# Patient Record
Sex: FEMALE | Race: WHITE | NOT HISPANIC OR LATINO | Employment: PART TIME | URBAN - METROPOLITAN AREA
[De-identification: names, ages, dates, MRNs, and addresses within clinical notes are randomized per-mention and may not be internally consistent; named-entity substitution may affect disease eponyms.]

---

## 2018-09-01 ENCOUNTER — AMBULATORY - HEALTHEAST (OUTPATIENT)
Dept: MULTI SPECIALTY CLINIC | Facility: CLINIC | Age: 38
End: 2018-09-01

## 2018-09-01 LAB — PAP SMEAR - HIM PATIENT REPORTED: NORMAL

## 2019-09-23 ENCOUNTER — TRANSFERRED RECORDS (OUTPATIENT)
Dept: HEALTH INFORMATION MANAGEMENT | Facility: CLINIC | Age: 39
End: 2019-09-23

## 2019-09-23 LAB
ALT SERPL-CCNC: 12 U/L (ref 6–29)
AST SERPL-CCNC: 16 U/L (ref 10–30)
CHOLESTEROL (EXTERNAL): 181 MG/DL
CREATININE (EXTERNAL): 0.87 MG/DL (ref 0.5–1.1)
GFR ESTIMATED (EXTERNAL): 83 ML/MIN/1.73M2
GLUCOSE (EXTERNAL): 84 MG/DL (ref 65–99)
HDLC SERPL-MCNC: 60 MG/DL
LDL CHOLESTEROL (EXTERNAL): 101 MG/DL
NON HDL CHOLESTEROL (EXTERNAL): 121 MG/DL
POTASSIUM (EXTERNAL): 4.4 MMOL/L (ref 3.5–5.3)
TRIGLYCERIDES (EXTERNAL): 100 MG/DL
TSH SERPL-ACNC: 1.61 MIU/L (ref 0.4–4.5)

## 2019-09-25 ENCOUNTER — OFFICE VISIT - HEALTHEAST (OUTPATIENT)
Dept: MIDWIFE SERVICES | Facility: CLINIC | Age: 39
End: 2019-09-25

## 2019-09-25 DIAGNOSIS — Z00.00 NORMAL BREAST EXAM: ICD-10-CM

## 2019-09-25 DIAGNOSIS — Z12.39 BREAST CANCER SCREENING: ICD-10-CM

## 2019-09-25 RX ORDER — BIOTIN 10 MG
TABLET ORAL
Status: SHIPPED | COMMUNITY
Start: 2019-09-25 | End: 2022-04-20

## 2019-09-25 ASSESSMENT — MIFFLIN-ST. JEOR: SCORE: 1392.3

## 2020-09-28 ENCOUNTER — COMMUNICATION - HEALTHEAST (OUTPATIENT)
Dept: MIDWIFE SERVICES | Facility: CLINIC | Age: 40
End: 2020-09-28

## 2021-06-01 NOTE — PROGRESS NOTES
Assessment:      Healthy female exam.      Plan:      Reviewed guidelines for pap screening-- no need for pap smear today.  Reviewed recommendation for starting mammograms-- provided patient with handout and number for scheduling mammogram. Leaning toward deferring until age 45-50, but has resources if she decides to schedule this year.  Breast self exam technique reviewed and patient encouraged to practice breast awareness rather than monthly self-exam.    All questions answered.    Subjective:      Shayy Gaona is a 38 y.o. female who presents for an annual exam. Had a complete physical with Dr. Larsen on [facility]-- here for a breast exam and pap. Reviewed pap guidelines, had a normal pap approximately one year ago. Working on getting records from [place]. No need to repeat pap at this time.  The patient is not currently sexually active. The patient participates in regular exercise: yes. The patient reports that there is not domestic violence in her life.    Healthy Habits:   Regular Exercise: Yes  Sexually active: No  Self Breast Exam Monthly:No    Gynecologic History  Patient's last menstrual period was 2019.  Contraception: abstinence  Last Pap: 2018. Results were: normal  Last mammogram: N/A.    OB History    Para Term  AB Living   0 0 0 0 0 0   SAB TAB Ectopic Multiple Live Births   0 0 0 0 0       Current Outpatient Medications   Medication Sig Dispense Refill     biotin 10 mg Tab Take by mouth.       cholecalciferol, vitamin D3, (VITAMIN D3) 2,000 unit capsule Take 1,000 Units by mouth daily.       No current facility-administered medications for this visit.      Past Medical History:   Diagnosis Date     Bowel trouble      Eating disorder      Urinary tract infection      Past Surgical History:   Procedure Laterality Date     CATARACT EXTRACTION Right      Patient has no known allergies.  Family History   Problem Relation Age of Onset     Arthritis Mother      Hyperlipidemia  "Mother      Hypertension Mother      Kidney disease Mother      Vision loss Mother      Arthritis Father      Hearing loss Father      Hyperlipidemia Father      Hypertension Father      Diabetes Maternal Uncle      Colon cancer Maternal Grandfather      Social History     Socioeconomic History     Marital status: Single     Spouse name: Not on file     Number of children: Not on file     Years of education: Not on file     Highest education level: Not on file   Occupational History     Not on file   Social Needs     Financial resource strain: Not on file     Food insecurity:     Worry: Not on file     Inability: Not on file     Transportation needs:     Medical: Not on file     Non-medical: Not on file   Tobacco Use     Smoking status: Former Smoker     Packs/day: 0.00     Smokeless tobacco: Former User   Substance and Sexual Activity     Alcohol use: Yes     Comment: 0-1 drinks / week     Drug use: Not Currently     Sexual activity: Not Currently     Birth control/protection: None   Lifestyle     Physical activity:     Days per week: Not on file     Minutes per session: Not on file     Stress: Not on file   Relationships     Social connections:     Talks on phone: Not on file     Gets together: Not on file     Attends Taoism service: Not on file     Active member of club or organization: Not on file     Attends meetings of clubs or organizations: Not on file     Relationship status: Not on file     Intimate partner violence:     Fear of current or ex partner: Not on file     Emotionally abused: Not on file     Physically abused: Not on file     Forced sexual activity: Not on file   Other Topics Concern     Not on file   Social History Narrative     Not on file       Review of Systems  General:  Denies problems     Objective:         Vitals:    09/25/19 1626   BP: 102/68   Pulse: 73   SpO2: 98%   Weight: 156 lb (70.8 kg)   Height: 5' 6.5\" (1.689 m)       Physical Exam:  General Appearance: Alert, cooperative, no " distress, appears stated age, Breasts: No breast masses, tenderness, asymmetry, or nipple discharge., Pelvic:Not examined, Extremities: Extremities normal, atraumatic, no cyanosis or edema, Skin: Skin color, texture, turgor normal, no rashes or lesions, Lymph nodes: Cervical, supraclavicular, and axillary nodes normal and Neurologic: Normal        Total time spend with patient 20 minutes. >50% of the time spent counseling, educating and coordinating care.    Naila Rodriguez CNM

## 2021-06-03 VITALS
DIASTOLIC BLOOD PRESSURE: 68 MMHG | OXYGEN SATURATION: 98 % | HEIGHT: 67 IN | HEART RATE: 73 BPM | WEIGHT: 156 LBS | BODY MASS INDEX: 24.48 KG/M2 | SYSTOLIC BLOOD PRESSURE: 102 MMHG

## 2022-04-19 ASSESSMENT — ENCOUNTER SYMPTOMS
DIARRHEA: 0
MYALGIAS: 0
BREAST MASS: 0
HEMATOCHEZIA: 0
ARTHRALGIAS: 0
PARESTHESIAS: 0
SORE THROAT: 0
NAUSEA: 0
HEARTBURN: 0
DIZZINESS: 0
JOINT SWELLING: 0
CHILLS: 0
CONSTIPATION: 0
DYSURIA: 0
HEMATURIA: 0
HEADACHES: 0
EYE PAIN: 0
FEVER: 0
COUGH: 0
WEAKNESS: 0
SHORTNESS OF BREATH: 0
FREQUENCY: 0
ABDOMINAL PAIN: 0
NERVOUS/ANXIOUS: 0
PALPITATIONS: 0

## 2022-04-20 ENCOUNTER — OFFICE VISIT (OUTPATIENT)
Dept: FAMILY MEDICINE | Facility: CLINIC | Age: 42
End: 2022-04-20
Payer: COMMERCIAL

## 2022-04-20 VITALS
WEIGHT: 150 LBS | BODY MASS INDEX: 23.54 KG/M2 | SYSTOLIC BLOOD PRESSURE: 108 MMHG | HEIGHT: 67 IN | HEART RATE: 89 BPM | DIASTOLIC BLOOD PRESSURE: 62 MMHG | OXYGEN SATURATION: 100 %

## 2022-04-20 DIAGNOSIS — E55.9 VITAMIN D DEFICIENCY: ICD-10-CM

## 2022-04-20 DIAGNOSIS — Z85.850 HISTORY OF THYROID CANCER: ICD-10-CM

## 2022-04-20 DIAGNOSIS — Z00.00 ROUTINE GENERAL MEDICAL EXAMINATION AT A HEALTH CARE FACILITY: Primary | ICD-10-CM

## 2022-04-20 DIAGNOSIS — Z11.59 NEED FOR HEPATITIS C SCREENING TEST: ICD-10-CM

## 2022-04-20 DIAGNOSIS — E89.0 POSTOPERATIVE HYPOTHYROIDISM: ICD-10-CM

## 2022-04-20 DIAGNOSIS — Z01.419 ENCOUNTER FOR GYNECOLOGICAL EXAMINATION WITHOUT ABNORMAL FINDING: ICD-10-CM

## 2022-04-20 DIAGNOSIS — Z13.1 SCREENING FOR DIABETES MELLITUS: ICD-10-CM

## 2022-04-20 DIAGNOSIS — Z13.220 SCREENING FOR LIPID DISORDERS: ICD-10-CM

## 2022-04-20 PROCEDURE — 90471 IMMUNIZATION ADMIN: CPT | Performed by: STUDENT IN AN ORGANIZED HEALTH CARE EDUCATION/TRAINING PROGRAM

## 2022-04-20 PROCEDURE — G0145 SCR C/V CYTO,THINLAYER,RESCR: HCPCS | Performed by: STUDENT IN AN ORGANIZED HEALTH CARE EDUCATION/TRAINING PROGRAM

## 2022-04-20 PROCEDURE — 87624 HPV HI-RISK TYP POOLED RSLT: CPT | Performed by: STUDENT IN AN ORGANIZED HEALTH CARE EDUCATION/TRAINING PROGRAM

## 2022-04-20 PROCEDURE — 90715 TDAP VACCINE 7 YRS/> IM: CPT | Performed by: STUDENT IN AN ORGANIZED HEALTH CARE EDUCATION/TRAINING PROGRAM

## 2022-04-20 PROCEDURE — 99386 PREV VISIT NEW AGE 40-64: CPT | Mod: 25 | Performed by: STUDENT IN AN ORGANIZED HEALTH CARE EDUCATION/TRAINING PROGRAM

## 2022-04-20 RX ORDER — LEVOTHYROXINE SODIUM 150 UG/1
TABLET ORAL
COMMUNITY
Start: 2021-04-30 | End: 2024-03-04 | Stop reason: DRUGHIGH

## 2022-04-20 ASSESSMENT — ENCOUNTER SYMPTOMS
COUGH: 0
NAUSEA: 0
CHILLS: 0
HEADACHES: 0
PALPITATIONS: 0
BREAST MASS: 0
ARTHRALGIAS: 0
FEVER: 0
DYSURIA: 0
CONSTIPATION: 0
EYE PAIN: 0
DIZZINESS: 0
SHORTNESS OF BREATH: 0
NERVOUS/ANXIOUS: 0
JOINT SWELLING: 0
PARESTHESIAS: 0
ABDOMINAL PAIN: 0
HEMATURIA: 0
DIARRHEA: 0
FREQUENCY: 0
HEARTBURN: 0
SORE THROAT: 0
WEAKNESS: 0
HEMATOCHEZIA: 0
MYALGIAS: 0

## 2022-04-20 NOTE — PATIENT INSTRUCTIONS
Call to schedule a lab only appointment when you are fasting.     Pap smear today for cervical cancer screening.     Tetanus vaccine today     Preventive Health Recommendations  Female Ages 40 to 49    Yearly exam:   See your health care provider every year in order to  Review health changes.   Discuss preventive care.    Review your medicines if your doctor prescribed any.    Get a Pap test every three years (unless you have an abnormal result and your provider advises testing more often).    If you get Pap tests with HPV test, you only need to test every 5 years, unless you have an abnormal result. You do not need a Pap test if your uterus was removed (hysterectomy) and you have not had cancer.    You should be tested each year for STDs (sexually transmitted diseases), if you're at risk.   Ask your doctor if you should have a mammogram.    Have a colonoscopy (test for colon cancer) if someone in your family has had colon cancer or polyps before age 50.     Have a cholesterol test every 5 years.     Have a diabetes test (fasting glucose) after age 45. If you are at risk for diabetes, you should have this test every 3 years.    Shots: Get a flu shot each year. Get a tetanus shot every 10 years.     Nutrition:   Eat at least 5 servings of fruits and vegetables each day.  Eat whole-grain bread, whole-wheat pasta and brown rice instead of white grains and rice.  Get adequate Calcium and Vitamin D.      Lifestyle  Exercise at least 150 minutes a week (an average of 30 minutes a day, 5 days a week). This will help you control your weight and prevent disease.  Limit alcohol to one drink per day.  No smoking.   Wear sunscreen to prevent skin cancer.  See your dentist every six months for an exam and cleaning.

## 2022-04-20 NOTE — PROGRESS NOTES
SUBJECTIVE:   CC: Shayy Gaona is an 41 year old woman who presents for preventive health visit.       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         PAP-   Last done 3-4years? Last pap in 2018 in [place], was normal, but not able to see if this was co-testing.     Hx of thyroid cancer  Thyroidectomy 12/3/2019 and GLEASON  Follows with endocrinology     Former smoker, quit , 6 years. 1 PPD     Hx of drug use - marijuana, ecstasy. Occasional other drug use in past. Quit In     Occasional alcohol use.     Healthy diet, intermittent fasting.   Exercises, strength training, running.       Not sexually active      Regular menstrual cycles still.   No children.      Takes progesterone cream on abdomen - this has helped with spotting           Today's PHQ-2 Score:   PHQ-2 (  [employer]) 2022   Q1: Little interest or pleasure in doing things 0   Q2: Feeling down, depressed or hopeless 0   PHQ-2 Score 0   Q1: Little interest or pleasure in doing things Not at all   Q2: Feeling down, depressed or hopeless Not at all   PHQ-2 Score 0       Abuse: Current or Past (Physical, Sexual or Emotional) - No  Do you feel safe in your environment? Yes    Have you ever done Advance Care Planning? (For example, a Health Directive, POLST, or a discussion with a medical provider or your loved ones about your wishes): Yes, copy was made to scan into patient chart     Social History     Tobacco Use      Smoking status: Former Smoker     Packs/day: 1.00     Years: 5.00     Pack years: 5.00     Types: Cigarettes     Start date: 1996     Quit date: 2002     Years since quittin.3     Smokeless tobacco: Former User     Quit date: 2001   Substance Use Topics     Alcohol use: Yes     Comment: Occasional     If you drink alcohol do you typically have >3 drinks per day or >7 drinks per week? No    Alcohol Use 2022   Prescreen: >3 drinks/day or >7 drinks/week? -   Prescreen: >3 drinks/day or >7 drinks/week? No       Reviewed orders with patient.  Reviewed health maintenance and updated orders accordingly - Yes  Lab work is in process    Breast Cancer Screening:    FHS-7:   Breast CA Risk Assessment (FHS-7) 2022   Did any of your first-degree relatives have breast or ovarian cancer? No   Did any of your relatives have bilateral breast cancer? No   Did any man in your family have breast cancer? No   Did any woman in your family have breast and ovarian cancer? No   Did any woman in your family have breast cancer before age 50 y? No   Do you have 2 or more relatives with breast and/or ovarian cancer? No   Do you have 2 or more relatives with breast and/or bowel cancer? No     click delete button to remove this line now  Mammogram Screening - Offered annual screening and updated Health Maintenance for mutual plan based on risk factor consideration    Pertinent mammograms are reviewed under the imaging tab.    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   HENT: Negative for congestion, ear pain, hearing loss and sore throat.    Eyes: Negative for pain and visual disturbance.   Respiratory: Negative for cough and shortness of breath.    Cardiovascular: Negative for chest pain, palpitations and peripheral edema.   Gastrointestinal: Negative for abdominal pain, constipation, diarrhea, heartburn, hematochezia and nausea.   Breasts:  Negative for tenderness, breast mass and discharge.   Genitourinary: Negative for dysuria, frequency, genital sores, hematuria, pelvic pain, urgency, vaginal bleeding and vaginal discharge.   Musculoskeletal: Negative for arthralgias, joint swelling and myalgias.   Skin: Negative for rash.   Neurological: Negative for dizziness, weakness, headaches and paresthesias.   Psychiatric/Behavioral: Negative for mood changes. The patient is not nervous/anxious.      =     OBJECTIVE:   /62 (BP Location: Right arm, Patient Position: Sitting, Cuff Size: Adult Regular)   Pulse 89   Ht 1.702 m (5' 7\")   Wt 68 kg (150 lb)   SpO2 100%   BMI 23.49 kg/m    Physical Exam  GENERAL: healthy, alert and no distress  EYES: Eyes grossly normal to inspection, PERRL and conjunctivae and sclerae normal  HENT: ear canals and TM's normal, nose and mouth without ulcers or lesions  NECK: no adenopathy, no asymmetry, masses, or scars and thyroid normal to palpation  RESP: lungs clear to auscultation - no rales, rhonchi or wheezes  BREAST: normal without masses, tenderness or nipple discharge and no palpable axillary masses or adenopathy  CV: regular rate and rhythm, normal S1 S2, no S3 or S4, no murmur, click or rub, no peripheral edema and peripheral pulses strong  ABDOMEN: soft, nontender, no hepatosplenomegaly, no masses and bowel sounds " normal   (female): normal female external genitalia, normal urethral meatus, vaginal mucosa pink, moist, well rugated, and normal cervix/adnexa/uterus without masses or discharge  MS: no gross musculoskeletal defects noted, no edema  SKIN: no suspicious lesions or rashes  NEURO: Normal strength and tone, mentation intact and speech normal  PSYCH: mentation appears normal, affect normal/bright    Diagnostic Test Results:  Labs reviewed in Epic    ASSESSMENT/PLAN:   (Z00.00) Routine general medical examination at a health care facility  (primary encounter diagnosis)  Comment: vitals and BMI normal. Updating TDAP. Declined early mammogram screening.   Plan: TDAP VACCINE (Adacel, Boostrix)  [[id number]]            (Z85.850) History of thyroid cancer  Post-operative hypothyroidism  Comment: Treatment included thyroidectomy 12/2019 and GLEASON. Doing well post-op. Follows with endocrinology, Dr Mucha. On thyroid replacement    (E55.9) Vitamin D deficiency  Comment: on vitamin d supplementation, we will check her levels  Plan: Vitamin D Deficiency            (Z13.220) Screening for lipid disorders  Comment: she will return when she is fasting   Plan: Lipid panel reflex to direct LDL Fasting            (Z01.419) Encounter for gynecological examination without abnormal finding  Comment: last pap in 2018 in [place], I can't find records of the actual results to see if this was co-testing. We opted to repeat  Plan: PAP screen with HPV - recommended age 30 - 65         years            (Z11.59) Need for hepatitis C screening test  Plan: Hepatitis C antibody            (Z13.1) Screening for diabetes mellitus  Plan: Glucose              Patient has been advised of split billing requirements and indicates understanding: Yes    COUNSELING:  Reviewed preventive health counseling, as reflected in patient instructions       Regular exercise       Healthy diet/nutrition       Contraception       Consider Hep C screening for all patients  "one time for ages 18-79 years       (Cindy)menopause management    Estimated body mass index is 23.49 kg/m  as calculated from the following:    Height as of this encounter: 1.702 m (5' 7\").    Weight as of this encounter: 68 kg (150 lb).        She reports that she quit smoking about 20 years ago. Her smoking use included cigarettes. She started smoking about 26 years ago. She has a 5.00 pack-year smoking history. She quit smokeless tobacco use about 21 years ago.      Counseling Resources:  ATP IV Guidelines  Pooled Cohorts Equation Calculator  Breast Cancer Risk Calculator  BRCA-Related Cancer Risk Assessment: FHS-7 Tool  FRAX Risk Assessment  ICSI Preventive Guidelines  Dietary Guidelines for Americans, 2010  USDA's MyPlate  ASA Prophylaxis  Lung CA Screening    Minnie Martini DO  [facility]  "

## 2022-04-21 ENCOUNTER — LAB (OUTPATIENT)
Dept: LAB | Facility: CLINIC | Age: 42
End: 2022-04-21
Payer: COMMERCIAL

## 2022-04-21 DIAGNOSIS — Z13.1 SCREENING FOR DIABETES MELLITUS: ICD-10-CM

## 2022-04-21 DIAGNOSIS — Z13.220 SCREENING FOR LIPID DISORDERS: ICD-10-CM

## 2022-04-21 DIAGNOSIS — E55.9 VITAMIN D DEFICIENCY: ICD-10-CM

## 2022-04-21 DIAGNOSIS — Z11.59 NEED FOR HEPATITIS C SCREENING TEST: ICD-10-CM

## 2022-04-21 PROCEDURE — 80061 LIPID PANEL: CPT

## 2022-04-21 PROCEDURE — 36415 COLL VENOUS BLD VENIPUNCTURE: CPT

## 2022-04-21 PROCEDURE — 82947 ASSAY GLUCOSE BLOOD QUANT: CPT

## 2022-04-21 PROCEDURE — 82306 VITAMIN D 25 HYDROXY: CPT

## 2022-04-21 PROCEDURE — 86803 HEPATITIS C AB TEST: CPT

## 2022-04-22 LAB
CHOLEST SERPL-MCNC: 196 MG/DL
DEPRECATED CALCIDIOL+CALCIFEROL SERPL-MC: 74 UG/L (ref 20–75)
FASTING STATUS PATIENT QL REPORTED: NO
FASTING STATUS PATIENT QL REPORTED: NO
GLUCOSE BLD-MCNC: 89 MG/DL (ref 70–99)
HCV AB SERPL QL IA: NONREACTIVE
HDLC SERPL-MCNC: 80 MG/DL
LDLC SERPL CALC-MCNC: 105 MG/DL
NONHDLC SERPL-MCNC: 116 MG/DL
TRIGL SERPL-MCNC: 54 MG/DL

## 2022-04-25 LAB
BKR LAB AP GYN ADEQUACY: NORMAL
BKR LAB AP GYN INTERPRETATION: NORMAL
BKR LAB AP HPV REFLEX: NORMAL
BKR LAB AP PREVIOUS ABNORMAL: NORMAL
PATH REPORT.COMMENTS IMP SPEC: NORMAL
PATH REPORT.COMMENTS IMP SPEC: NORMAL
PATH REPORT.RELEVANT HX SPEC: NORMAL

## 2022-04-27 ENCOUNTER — DOCUMENTATION ONLY (OUTPATIENT)
Dept: OTHER | Facility: CLINIC | Age: 42
End: 2022-04-27
Payer: COMMERCIAL

## 2022-04-27 LAB
HUMAN PAPILLOMA VIRUS 16 DNA: NEGATIVE
HUMAN PAPILLOMA VIRUS 18 DNA: NEGATIVE
HUMAN PAPILLOMA VIRUS FINAL DIAGNOSIS: NORMAL
HUMAN PAPILLOMA VIRUS OTHER HR: NEGATIVE

## 2024-02-08 ENCOUNTER — VIRTUAL VISIT (OUTPATIENT)
Dept: FAMILY MEDICINE | Facility: CLINIC | Age: 44
End: 2024-02-08
Payer: COMMERCIAL

## 2024-02-08 ENCOUNTER — ORDERS ONLY (AUTO-RELEASED) (OUTPATIENT)
Dept: FAMILY MEDICINE | Facility: CLINIC | Age: 44
End: 2024-02-08

## 2024-02-08 DIAGNOSIS — Z12.11 SCREEN FOR COLON CANCER: ICD-10-CM

## 2024-02-08 DIAGNOSIS — C73 THYROID CANCER (H): ICD-10-CM

## 2024-02-08 DIAGNOSIS — E55.9 VITAMIN D DEFICIENCY: ICD-10-CM

## 2024-02-08 DIAGNOSIS — E89.0 POSTOPERATIVE HYPOTHYROIDISM: Primary | ICD-10-CM

## 2024-02-08 DIAGNOSIS — Z12.31 VISIT FOR SCREENING MAMMOGRAM: ICD-10-CM

## 2024-02-08 DIAGNOSIS — Z13.220 SCREENING FOR LIPID DISORDERS: ICD-10-CM

## 2024-02-08 PROCEDURE — 99442 PR PHYSICIAN TELEPHONE EVALUATION 11-20 MIN: CPT | Mod: 93 | Performed by: STUDENT IN AN ORGANIZED HEALTH CARE EDUCATION/TRAINING PROGRAM

## 2024-02-08 NOTE — PROGRESS NOTES
Shayy is a 43 year old who is being evaluated via a billable telephone visit.      What phone number would you like to be contacted at? [phone]  How would you like to obtain your AVS? Venessa    Distant Location (provider location):  On-site    Assessment & Plan     Postoperative hypothyroidism  Thyroid cancer (H)  Has been managed by endocrinology since surgery but they are no longer in her insurance due to some insurance changes. Reviewed their records. Currently taking levothyroxine 150 mcg 6 days per week and 1 day taking 300 mcg. Needs updated labs. Still has some medication from endo; will send refill once labs are back. She has not had an annual thyroid US since her surgery; she is agreeable to this, discussed recommendation for annual US for 5  years at least. Needs referral back to endo if thyroglobulin continues to rise or is >4.0.   - US Thyroid; Future  - Comprehensive metabolic panel (BMP + Alb, Alk Phos, ALT, AST, Total. Bili, TP); Future  - Vitamin D Deficiency; Future  - TSH; Future  - Thyroglobulin and Antibody Reflex; Future  - CBC with platelets and differential; Future  - Comprehensive metabolic panel (BMP + Alb, Alk Phos, ALT, AST, Total. Bili, TP); Future  - Vitamin D Deficiency; Future    Visit for screening mammogram  - MA SCREENING DIGITAL BILAT - Future  (s+30); Future    Screen for colon cancer  - COLOGRYAN(EXACT SCIENCES); Future    Vitamin D deficiency  - Vitamin D Deficiency; Future    Screening for lipid disorders  - Lipid panel reflex to direct LDL Fasting; Future      Subjective   Shayy is a 43 year old, presenting for the following health issues:  Thyroid Disease        2/8/2024    11:17 AM   Additional Questions   Accompanied by na         2/8/2024    11:17 AM   Patient Reported Additional Medications   Patient reports taking the following new medications none     Thyroid Disease    History of Present Illness       Hypothyroidism:     Since last visit, patient  describes the following symptoms::  None        Insurance is ending March 31, 2024.   Endocrinologist that she was seeing isn't on her new insurance.     Papillary thyroid cancer Patient status post total thyroidectomy U 12/06/2019     Insurance wouldn't cover ultrasound.    Denies any neck pain or goiters.     Was having palpitations with once/week 300 mcg. With 150 mcg daily. Thinks the palpiations were due to other causes.             A/P from Endocrinology at last visit on 4/29/22  1. Papillary thyroid cancer Patient status post total thyroidectomy U 12/06/2019 large tumor size is 3.5 cm with 6 of 9 lymph nodes being positive for for metastatic carcinoma largest metastatic focus is measures 1.2 cm full extra nodule tumor extension present A portion parathyroid gland and thymuspresent. Thyroid cancer appears to have extra thyroid nodule extension in possibly in to the strap muscle. Patient is classified as a [id number]. Plan to follow very closely for recurrence of thyroid cancer patient was treated with radioactive iodine 150 mCi. [facility] MACIS calculator for prognosis gives her 90% survival at 20 years which is based more on her age and then extent of disease so hopefully this is accurate. Unfortunate the patient has not followed for last 2 years. We request that she complete ultrasound yearly and has not completed ultrasounds full last 2 years. The patient has completed thyroglobulin levels which were stable but of slightly risen. The patient is not inclined to complete ultrasound unless it is absolutely necessary. We discuss that is standard of care for to complete yearly ultrasounds for least 5 years, but as she has more aggressive cancer most would recommend yearly for 10 years after her diagnosis. Patient does not desire to complete ultrasound at this time but will consider completing 1 a year. The patient currently does not have medical insurance this is her main reason for not completing ultrasound.  Patient may continue levothyroxine 150 mcg 6 of 7 days a week and 1 of 7 days a week 300 mcg. The patient's most recent TSH was 0.04. Patient's thyroglobulin lowest level is 3.1 it is very between 3.5 3.2 and 3.4 in the last 2 years. Most recently 3.4. We discussed that ultrasound is recommended and she is willing to complete ultrasound if her thyroglobulin continues to rise we would certainly recommend this for greater than 4.0. Again we discussed the standard care to complete thyroid ultrasounds yearly and she is a higher risk of recurrence based on her staging of thyroid cancer. Thyroid ultrasound is ordered be completed in 1 year and labs for TSH and thyroglobulin. Hopefully she will complete ultrasound in 1 year.           Review of Systems  Constitutional, HEENT, cardiovascular, pulmonary, gi and gu systems are negative, except as otherwise noted.      Objective           Vitals:  No vitals were obtained today due to virtual visit.    Physical Exam   General: Alert and no distress //Respiratory: No audible wheeze, cough, or shortness of breath // Psychiatric:  Appropriate affect, tone, and pace of words            Phone call duration: 20 minutes  Signed Electronically by: Minnie Martini DO

## 2024-02-13 ENCOUNTER — LAB (OUTPATIENT)
Dept: LAB | Facility: CLINIC | Age: 44
End: 2024-02-13
Payer: COMMERCIAL

## 2024-02-13 DIAGNOSIS — E89.0 POSTOPERATIVE HYPOTHYROIDISM: ICD-10-CM

## 2024-02-13 DIAGNOSIS — E55.9 VITAMIN D DEFICIENCY: ICD-10-CM

## 2024-02-13 DIAGNOSIS — C73 THYROID CANCER (H): ICD-10-CM

## 2024-02-13 DIAGNOSIS — Z13.220 SCREENING FOR LIPID DISORDERS: ICD-10-CM

## 2024-02-13 DIAGNOSIS — Z11.4 SCREENING FOR HIV (HUMAN IMMUNODEFICIENCY VIRUS): Primary | ICD-10-CM

## 2024-02-13 LAB
ALBUMIN SERPL BCG-MCNC: 4.5 G/DL (ref 3.5–5.2)
ALP SERPL-CCNC: 37 U/L (ref 40–150)
ALT SERPL W P-5'-P-CCNC: 16 U/L (ref 0–50)
ANION GAP SERPL CALCULATED.3IONS-SCNC: 11 MMOL/L (ref 7–15)
AST SERPL W P-5'-P-CCNC: 22 U/L (ref 0–45)
BASOPHILS # BLD AUTO: 0 10E3/UL (ref 0–0.2)
BASOPHILS NFR BLD AUTO: 0 %
BILIRUB SERPL-MCNC: 0.4 MG/DL
BUN SERPL-MCNC: 9.3 MG/DL (ref 6–20)
CALCIUM SERPL-MCNC: 9.6 MG/DL (ref 8.6–10)
CHLORIDE SERPL-SCNC: 100 MMOL/L (ref 98–107)
CHOLEST SERPL-MCNC: 187 MG/DL
CREAT SERPL-MCNC: 0.8 MG/DL (ref 0.51–0.95)
DEPRECATED HCO3 PLAS-SCNC: 26 MMOL/L (ref 22–29)
EGFRCR SERPLBLD CKD-EPI 2021: >90 ML/MIN/1.73M2
EOSINOPHIL # BLD AUTO: 0.1 10E3/UL (ref 0–0.7)
EOSINOPHIL NFR BLD AUTO: 1 %
ERYTHROCYTE [DISTWIDTH] IN BLOOD BY AUTOMATED COUNT: 11.8 % (ref 10–15)
FASTING STATUS PATIENT QL REPORTED: YES
GLUCOSE SERPL-MCNC: 94 MG/DL (ref 70–99)
HCT VFR BLD AUTO: 40.9 % (ref 35–47)
HDLC SERPL-MCNC: 68 MG/DL
HGB BLD-MCNC: 13.1 G/DL (ref 11.7–15.7)
HIV 1+2 AB+HIV1 P24 AG SERPL QL IA: NONREACTIVE
IMM GRANULOCYTES # BLD: 0 10E3/UL
IMM GRANULOCYTES NFR BLD: 0 %
LDLC SERPL CALC-MCNC: 108 MG/DL
LYMPHOCYTES # BLD AUTO: 1.1 10E3/UL (ref 0.8–5.3)
LYMPHOCYTES NFR BLD AUTO: 27 %
MCH RBC QN AUTO: 27 PG (ref 26.5–33)
MCHC RBC AUTO-ENTMCNC: 32 G/DL (ref 31.5–36.5)
MCV RBC AUTO: 84 FL (ref 78–100)
MONOCYTES # BLD AUTO: 0.4 10E3/UL (ref 0–1.3)
MONOCYTES NFR BLD AUTO: 11 %
NEUTROPHILS # BLD AUTO: 2.4 10E3/UL (ref 1.6–8.3)
NEUTROPHILS NFR BLD AUTO: 60 %
NONHDLC SERPL-MCNC: 119 MG/DL
PLATELET # BLD AUTO: 231 10E3/UL (ref 150–450)
POTASSIUM SERPL-SCNC: 4.2 MMOL/L (ref 3.4–5.3)
PROT SERPL-MCNC: 6.9 G/DL (ref 6.4–8.3)
RBC # BLD AUTO: 4.85 10E6/UL (ref 3.8–5.2)
SODIUM SERPL-SCNC: 137 MMOL/L (ref 135–145)
TRIGL SERPL-MCNC: 54 MG/DL
TSH SERPL DL<=0.005 MIU/L-ACNC: 0.19 UIU/ML (ref 0.3–4.2)
VIT D+METAB SERPL-MCNC: 47 NG/ML (ref 20–50)
WBC # BLD AUTO: 3.9 10E3/UL (ref 4–11)

## 2024-02-13 PROCEDURE — 84432 ASSAY OF THYROGLOBULIN: CPT

## 2024-02-13 PROCEDURE — 86800 THYROGLOBULIN ANTIBODY: CPT

## 2024-02-13 PROCEDURE — 82306 VITAMIN D 25 HYDROXY: CPT

## 2024-02-13 PROCEDURE — 87389 HIV-1 AG W/HIV-1&-2 AB AG IA: CPT

## 2024-02-13 PROCEDURE — 36415 COLL VENOUS BLD VENIPUNCTURE: CPT

## 2024-02-13 PROCEDURE — 85025 COMPLETE CBC W/AUTO DIFF WBC: CPT

## 2024-02-13 PROCEDURE — 84443 ASSAY THYROID STIM HORMONE: CPT

## 2024-02-13 PROCEDURE — 80061 LIPID PANEL: CPT

## 2024-02-13 PROCEDURE — 80053 COMPREHEN METABOLIC PANEL: CPT

## 2024-02-14 ENCOUNTER — HOSPITAL ENCOUNTER (OUTPATIENT)
Dept: MAMMOGRAPHY | Facility: CLINIC | Age: 44
Discharge: HOME | End: 2024-02-14
Attending: STUDENT IN AN ORGANIZED HEALTH CARE EDUCATION/TRAINING PROGRAM
Payer: COMMERCIAL

## 2024-02-14 ENCOUNTER — HOSPITAL ENCOUNTER (OUTPATIENT)
Dept: ULTRASOUND IMAGING | Facility: CLINIC | Age: 44
Discharge: HOME | End: 2024-02-14
Attending: STUDENT IN AN ORGANIZED HEALTH CARE EDUCATION/TRAINING PROGRAM
Payer: COMMERCIAL

## 2024-02-14 DIAGNOSIS — R59.1 LYMPHADENOPATHY: Primary | ICD-10-CM

## 2024-02-14 DIAGNOSIS — E89.0 POSTOPERATIVE HYPOTHYROIDISM: ICD-10-CM

## 2024-02-14 DIAGNOSIS — Z12.31 VISIT FOR SCREENING MAMMOGRAM: ICD-10-CM

## 2024-02-14 DIAGNOSIS — C73 THYROID CANCER (H): ICD-10-CM

## 2024-02-14 LAB — THYROGLOB AB SERPL IA-ACNC: <20 IU/ML

## 2024-02-14 PROCEDURE — 77067 SCR MAMMO BI INCL CAD: CPT

## 2024-02-14 PROCEDURE — 76536 US EXAM OF HEAD AND NECK: CPT

## 2024-02-15 LAB — THYROGLOB SERPL-MCNC: 4.8 NG/ML

## 2024-02-16 DIAGNOSIS — E89.0 POSTOPERATIVE HYPOTHYROIDISM: ICD-10-CM

## 2024-02-16 DIAGNOSIS — R59.1 LYMPHADENOPATHY: ICD-10-CM

## 2024-02-16 DIAGNOSIS — C73 THYROID CANCER (H): ICD-10-CM

## 2024-02-16 NOTE — PROGRESS NOTES
"    Outpatient Neuro Radiology Referral  02/16/24    Referring Provider: Dr. Martini  Neuro Rad Referral Request: \"needs FNA of the enlarged right lymph node\"  Procedure Approved for: US-guided right neck lymph node    Case and imaging was reviewed with Dr. Cadet and Dr. Rhodes with Neuro Radiology    Brief History:    Shayy Gaona is a 43 year old female with history of thyroid cancer and increasing thyroglobulin. Family med provider is asking [facility] to perform an FNA of patient's enlarged right neck lymph discovered on recent US of her thyroid to evaluate for recurrence. increasing thyroglobulin    Pertinent Imaging Reviewed:    US thyroid from 2/14/24    Procedure order and surgical pathology order placed.    If requesting team would like sample sent for anything else please use the IR order set \"IR RAD Biopsy or Fluid Aspirate Specimens\" to select your necessary diagnostic labs and pend for admission.   If there are labs you desire that are not found in this order set or you have questions regarding specific diagnostic labs please call the associated lab personnel.     Requesting team, Dr. Martini, made aware of IR recommendations via Epic messaging.  "

## 2024-02-19 NOTE — TELEPHONE ENCOUNTER
DX, Referring NOTES: Lymphadenopathy, Thyroid cancer, Postoperative hypothyroidism     For Cancer Patients: Need the original operative and surgical pathology reports and all imaging reports/images related to the disease (includes all thyroid US, nuclear thyroid and total body scans, PET scans, chest CT reports since prior to the diagnosis ).   APPT DATE: 3/4/2024   NOTES (FOR ALL VISITS) STATUS DETAILS   OFFICE NOTES from referring provider Internal [facility]:  2/8/24, 4/20/22 - PCC OV with Dr. Meyer   OFFICE NOTES from other specialist Care Everywhere [facility]:  4/29/22, 12/19/19 - ENDO OV with Dr. Mucha  1/8/20 - ENDO OV with Gary Tony RN  12/18/19 - GEN SURG OV with Dr. Norwood   ED NOTES N/A    OPERATIVE REPORT  (thyroid, pituitary, adrenal, parathyroid)  (All op notes related to diagnoses) Care Everywhere [facility]:  12/3/19 - OP Note for TOTAL THYROIDECTOMY with Dr. Norwood   MEDICATION LIST Internal    IMAGING      NUCLEAR Received [facility]:  1/17/20 - NM Whole Body  1/10/20 - NM Thyroid   ULTRASOUND (HEAD/NECK)  * Include FNAs Received / Internal [facility]:  (PROSPER) 2/26/24 - US Lymph Node FNA  2/14/24 - US Thyroid    [facility]:  10/15/19 - US Thyroid FNA   LABS     DIABETES: HBGA1C, CREATININE, FASTING LIPIDS, MICROALBUMIN URINE, POTASSIUM, TSH, T4    THYROID: TSH, T4, CBC, THYRODLONULIN, TOTAL T3, FREE T4, CALCITONIN, CEA Care Everywhere / Internal [facility]:  2/13/24 - CBC  2/13/24 - CMP  2/13/24 - Lipid  2/13/24 - Thyroglobulin  2/13/24 - TSH  2/13/24 - Vitamin D  4/21/22 - Glucose    [facility]:  12/4/19 - Calcium  11/25/19 - BMP   PATHOLOGY REPORTS WITH CASE NUMBER  *Surgical path reports for endocrine organs (ovaries, testes, pancreas, etc) Care Everywhere   [facility]:  12/3/19 - Thyroid (Case: [id number])    [facility]:  10/16/19 - Lymph Node FNA (Case: [id number])     Records Requested  02/19/24    Facility  [facility]  Fax: [phone]   Outcome * 2/19/24 8:06 AM Faxed urgent  request to [facility] for images to be sent to the clinic. - Marie    * 2/19/24 2:01 PM Images received from [facility] and attached to the patient in PACs. - Marie

## 2024-02-26 ENCOUNTER — HOSPITAL ENCOUNTER (OUTPATIENT)
Dept: ULTRASOUND IMAGING | Facility: HOSPITAL | Age: 44
Discharge: HOME | End: 2024-02-26
Attending: STUDENT IN AN ORGANIZED HEALTH CARE EDUCATION/TRAINING PROGRAM | Admitting: STUDENT IN AN ORGANIZED HEALTH CARE EDUCATION/TRAINING PROGRAM
Payer: COMMERCIAL

## 2024-02-26 DIAGNOSIS — C73 THYROID CANCER (H): ICD-10-CM

## 2024-02-26 DIAGNOSIS — E89.0 POSTOPERATIVE HYPOTHYROIDISM: ICD-10-CM

## 2024-02-26 DIAGNOSIS — R59.1 LYMPHADENOPATHY: ICD-10-CM

## 2024-02-26 PROCEDURE — 88173 CYTOPATH EVAL FNA REPORT: CPT | Mod: 26 | Performed by: PATHOLOGY

## 2024-02-26 PROCEDURE — 88305 TISSUE EXAM BY PATHOLOGIST: CPT | Mod: TC | Performed by: STUDENT IN AN ORGANIZED HEALTH CARE EDUCATION/TRAINING PROGRAM

## 2024-02-26 PROCEDURE — 88172 CYTP DX EVAL FNA 1ST EA SITE: CPT | Mod: 26 | Performed by: PATHOLOGY

## 2024-02-26 PROCEDURE — 272N000710 US BIOPSY FINE NEEDLE ASPIRATION LYMPH NODE

## 2024-02-26 PROCEDURE — 88173 CYTOPATH EVAL FNA REPORT: CPT | Mod: TC | Performed by: STUDENT IN AN ORGANIZED HEALTH CARE EDUCATION/TRAINING PROGRAM

## 2024-02-26 PROCEDURE — 88305 TISSUE EXAM BY PATHOLOGIST: CPT | Mod: 26 | Performed by: PATHOLOGY

## 2024-02-28 LAB
NONINV COLON CA DNA+OCC BLD SCRN STL QL: NEGATIVE
PATH REPORT.COMMENTS IMP SPEC: ABNORMAL
PATH REPORT.COMMENTS IMP SPEC: ABNORMAL
PATH REPORT.COMMENTS IMP SPEC: YES
PATH REPORT.FINAL DX SPEC: ABNORMAL
PATH REPORT.GROSS SPEC: ABNORMAL
PATH REPORT.MICROSCOPIC SPEC OTHER STN: ABNORMAL
PATH REPORT.RELEVANT HX SPEC: ABNORMAL

## 2024-03-03 ENCOUNTER — HEALTH MAINTENANCE LETTER (OUTPATIENT)
Age: 44
End: 2024-03-03

## 2024-03-04 ENCOUNTER — PRE VISIT (OUTPATIENT)
Dept: ENDOCRINOLOGY | Facility: CLINIC | Age: 44
End: 2024-03-04

## 2024-03-04 ENCOUNTER — VIRTUAL VISIT (OUTPATIENT)
Dept: ENDOCRINOLOGY | Facility: CLINIC | Age: 44
End: 2024-03-04
Attending: STUDENT IN AN ORGANIZED HEALTH CARE EDUCATION/TRAINING PROGRAM
Payer: COMMERCIAL

## 2024-03-04 DIAGNOSIS — E89.0 POSTOPERATIVE HYPOTHYROIDISM: ICD-10-CM

## 2024-03-04 DIAGNOSIS — C73 PAPILLARY THYROID CARCINOMA (H): Primary | ICD-10-CM

## 2024-03-04 DIAGNOSIS — R59.1 LYMPHADENOPATHY: ICD-10-CM

## 2024-03-04 PROCEDURE — 99417 PROLNG OP E/M EACH 15 MIN: CPT

## 2024-03-04 PROCEDURE — 99205 OFFICE O/P NEW HI 60 MIN: CPT | Mod: 95

## 2024-03-04 RX ORDER — LEVOTHYROXINE SODIUM 150 UG/1
150 TABLET ORAL DAILY
Qty: 96 TABLET | Refills: 4 | Status: SHIPPED | OUTPATIENT
Start: 2024-03-04

## 2024-03-04 NOTE — PROGRESS NOTES
Endocrine Consult Video visit note-    Attending Assessment/Plan :      Cervical adenopathy bilaterally including   A\ Right lateral neck mass with cytology Positive for papillary thyroid carcinoma on recent FNAB.  The FNAB images are not viewable on PACS  B) Abnormal cervical adenopathy left lateral neck     Recommended surgery-- Unclear if right lateral neck dissection  will be sufficient - she has abnormal left sided lateral neck lymph node and history of sarcoidosis  Options discussed   FNAB abnormal left neck mass-- I am placing order for this   She will reach out to Dr Marie Norwood for follow up appt to discuss 2nd operation but it is clear Shayy is very hesitant.      Shayy is also asking about options to delay or avoid surgery.  I have counseled her that I do not believe other treatments will be effective.  We could get more data which might be helpful to convince her to move forward  123I total body scan might be convincing that radioiodine is unlikely to be effective without surgery   Chest CT-- we will order this now - no contrast.   PLEASE AVOID IODINE/CT CONTRAST as IT WILL DELAY OR INTERFERE WITH FUTURE USE OF RADIOACTIVE IODINE FOR DIAGNOSTIC OR THERAPEUTIC PURPOSES.       Addendum  3/13/24 CT chest : old granulomatous changes - 2 calcified lung nodules, largest 5 mm.    3/12/24 FNAB procedure in relationship to 2/14/24 US thyroid   Abnormal neck mass right lateral neck  1.3 x 0.8 x 1.4  Abnromal neck mass right lateral neck 1.2 x 1 x 1.9   Abnormal neck mass left neck 0.7 x 0.8 x 1   Left neck mass abnormal 0.8 x 0.8 x 1.4 cm  3/12/24 FNAB - positive   5/13/24 Tg 3.1, ARPAN < 0.4   5/20/24 TSH 2.33, free T4 1.67  7/15/24 Tg 3.6 (concurrent 2.9) , Arpan < 0.4, TSH 0.25, free T4 1.9  9/16/24 Tg 10.3 (concurrent 2.1), ARPAN < 0.4, TSh 2.37, free T4 1.79-- she has no follow up scheduled with me.   9/26/24 Tg 5.7 (concurrent 10.2) , ARPAN < 0.4 -- using my every 2 month standing order prematurely .     2.   Papillary thyroid carcinoma, bilateral, multifocal , largest 3.5 cm with ETE, + margin, + 6/9 central neck LNs with AFTAB in 2019 . Treatment was total Tx, CND and 131.  She has had persistent Tg since diagnosis.  The tg has probably risen a bit in the last years.   pT3b, pN1b, cMx  ARPAN recurrent risk high  MACIS 6.33     3.  Post surgical hypothyroidism.  Change LT4 to 150 x 7.5/week (which is what she is taking now but subdivided differently)  Labs in May, 2024 TSH, free T4, Tg send out to [facility]     4.  Fear of surgical  scar/ insurance issue  -- these appear to be the current barriers to moving forward with surgery for # 1.     5.  History of sarcoidosis per the medical record -- I didn't catch this until after the appt so we didn't discuss it.  This broadens the differential of adenopathy    Due to the COVID 19 pandemic this visit was a video visit in order to help prevent spread of infection in this patient and the general population. The patient gave verbal consent for the visit today. I have independently reviewed and interpreted labs, imaging as indicated.     Distant Location (provider location):  Off-site  Mode of Communication:  Video Conference via [employer]  Chart review/prep time 1  [id number]; [id number]  Visit Start time  1743   Visit Stop time  1814   _96_ minutes spent on the date of the encounter doing chart review, history and exam, documentation and further activities as noted above.    Malika Gottlieb MD    Chief complaint:  Shayy is a 43 year old female seen in consultation at the request of Dr Minnie Martini for Lymphadenopathy positive for PTC   I have reviewed Care Everywhere including  [facility] reports, imaging reports and provider notes as indicated.      HISTORY OF PRESENT ILLNESS  Shayy has been under the care of Dr Greg Mucha at [facility]. She last saw him 4/29/22 . At that time she had no medical insurance.  He recommended US but she didn't want to due to the  insurance situation.      Shayy arias presented to Dr Mucha 10/30/19 with report of FNAB of thyroid masses and LNs that were positive for papillary thyroid carcinoma .  Lateral neck US was not performed prior to surgery.   It appears neck CT was recommended but not done then.   Treatment of the thyroid cancer has been as follows:   12/3/19 Total thyroidectomy and  central neck LN dissection (Dr Marie Norwood).  Multifocal bilateral PTC, largest 3.5 cm, +ETE macroscopic and histologically confirmed invading strap muscle , positive margin; + 6/9 level 6 LN with AFTAB, largest deposit 1.2   1/10/2020 148.1 mCi 131I ([email or web address])   2/26/24 abnormal right lateral neck mass FNAB + PTC    Today she notes she has been on LT4 150 mcg x 8/week.  She had heart palpitations and it was reduced to 150 x 8 every 2 weeks  .   Endocrine relevant labs are as follows:  9/23/19 TSh 1.61  1/10/2020 Tg 64.6, ARPAN < 1, TSH 95.86  5/4/2020 Tg 4.7, ARPAN < 1, TSH 3.27  8/31/2020 Tg 3.1, ARPAN < 1, TSH0.89  11/3/2020 TSH 0.04  5/25/21 Tg 3.5, ARPAN < 1  11/22/2021 Tg 3.2, ARPAN < 1 TSh 0.49  4/2/22 Tg 3.4, ARPAN < 1 TSH 0.04  11/7/2022 TSh 1.03  2/13/24 TSH 0.19, Tg 4.8 in house assay, ARPAN < 20, Ca 9.6    Relevant imaging is as follows: (as read by me as it pertains to endocrine relevant organs)  10/15/19   FNAB right mid mass 2.3 x 2 x 4.1 cm FNAB papillary thyroid carcinoma  Right inf mass 1.4 x 1 x 1.5 cm FNAB papillary thyroid carcinoma  Left inf mass 0.8 x 0.8 x 1.2 FNAB papillary thyroid carcinoma  Left lateral neck mass? 1 x 0.6 x 1.3 - FNAB papillary thyroid carcinoma  1/10/2020 2.2 mCi 123I : 1.7% uptake neck, midline; no abnormal uptake  1/10/2020 148.1 mCi 131I ([email or web address])   1/17/2020 post therapy TBS negative ; neck uptake only   2/14/24 US thyroid   Abnormal neck mass right lateral neck  1.3 x 0.8 x 1.4  Abnromal neck mass right lateral neck 1.2 x 1 x 1.9   Abnormal neck mass left neck 0.7 x 0.8 x 1   Left neck mass abnormal 0.8 x 0.8 x  1.4 cm      REVIEW OF SYSTEMS  Mixed feelings about it   No symptoms   She recalls she had surgery x 8 hours  VC messed up with the surgery - doesn't have the same singing range since the surgery   Energy OK  Sleep OK   Cardiac: no palpitations  Respiratory: negative  GI: negative   Menses monthly   Insurance will be changing again late March -   Don't want to have surgery again  -- dont want the long scar on both sides of the neck.; dont' want big scars  Want to treat it myself in other ways -  10 system ROS otherwise as per the HPI or negative    Past Medical History  Past Medical History:   Diagnosis Date    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      levothyroxine (SYNTHROID/LEVOTHROID) 150 MCG tablet Take 1 tablet 6 of 7 days a week, and 2 tablets 1 of 7 days a week.       LT4 dose was 150 x 7 days/week alternating with 150 x 8 every other week    Allergies  Allergies   Allergen Reactions    Blood Transfusion Related (Informational Only)      Jehovah witness--- DECLINED BLOOD TRANSFUSION        Family History  family history includes Arthritis in her father and mother; Colon Cancer in her maternal grandfather; Diabetes in her maternal uncle; Hearing Loss in her father; Hyperlipidemia in her father and mother; Hypertension in her father and mother; Kidney Disease in her mother; Vision Loss in her mother.    Social History  Social History     Tobacco Use    Smoking status: Former     Packs/day: 1.00     Years: 5.00     Additional pack years: 0.00     Total pack years: 5.00     Types: Cigarettes     Start date: 1/1/1996     Quit date: 1/1/2002     Years  "since quittin.1    Smokeless tobacco: Former     Quit date: 2001   Substance Use Topics    Alcohol use: Yes     Comment: Occasional    Drug use: Not Currently     Works as RN    Physical Exam  There is no height or weight on file to calculate BMI.  BP Readings from Last 1 Encounters:   22 108/62      Pulse Readings from Last 1 Encounters:   22 89      Resp Readings from Last 1 Encounters:   No data found for Resp      Temp Readings from Last 1 Encounters:   No data found for Temp      SpO2 Readings from Last 1 Encounters:   22 100%      Wt Readings from Last 1 Encounters:   22 68 kg (150 lb)      Ht Readings from Last 1 Encounters:   22 1.702 m (5' 7\")     GENERAL: young woman in no distress  SKIN: Visible skin clear. No significant rash, abnormal pigmentation or lesions.  EYES: Eyes grossly normal to inspection.  No discharge or erythema, or obvious scleral/conjunctival abnormalities.  NECK: visible goiter is not present; no visible neck masses  RESP: No audible wheeze, cough, or visible cyanosis.  No visible retractions or increased work of breathing.    NEURO: Awake, alert, responds appropriately to questions.  Mentation and speech fluent.  PSYCH:affect a little flat; appearance well-groomed.      DATA REVIEW    FNA/ Needle Core Biopsy    Component 4 yr ago   Case Report FNA                                               Case: [id number]                                  Authorizing Provider:  Virgil Shi MD     Collected:           10/15/2019 01:50 PM          Ordering Location:     [facility]          Received:            10/15/2019 04:25 PM                                 Ultrasound                                                                  Pathologist:           Joyce Connelly MD                                                      Specimens:   A) - Thyroid Gland, right middle lobe                                                               B) " - Lymph node, Right Inferior Neck                                                               C) - Thyroid Gland, left lower lobe                                                                 D) - Lymph node, Left Middle Neck                                                       FINAL DIAGNOSIS    A.  Thyroid Gland, right middle lobe, fine needle aspiration:  Papillary thyroid carcinoma  B.  Lymph node, right inferior neck, fine needle aspiration:  Metastatic papillary thyroid carcinoma  C.  Thyroid Gland, left lower lobe, fine needle aspiration:  Papillary thyroid carcinoma  D.  Lymph node, left middle neck, fine needle aspiration:  Metastatic papillary thyroid carcinoma   Comment:  The case was seen in consultation with Dr. Saucedo who concurs with the diagnoses.  The diagnosis was given to Daria Victor RN on October 16, 2019.    Electronically signed by Joyce Connelly MD on 10/16/2019 at 11:36 AM   Gross Description    A.  Thyroid Gland, right middle lobe: Ultrasound guided fine needle aspiration (FNA) performed by Dr. Terrazas.  Adequacy interpretation at time of procedure is performed by GONZALEZ Conner ([facility]).  In total, 4 air-dried and 1 fixed smears are prepared. They are stained with Diff-Quik and Papanicolaou stains, respectively. Needles are rinsed in 30 mL of CytoRich Red fixative and processed to make 1 additional Pap stained SurePath slide.  Evaluation episode 1:  Adequat  B.  Lymph node, Right Inferior Neck: Ultrasound guided fine needle aspiration (FNA) performed by Dr. Terrazas.  Adequacy interpretation at time of procedure is performed by GONZALEZ Conner ([facility]).  In total, 4 air-dried and 1 fixed smears are prepared. They are stained with Diff-Quik and Papanicolaou stains, respectively. Needles are rinsed in 30 mL of CytoRich Red fixative and processed to make 1 additional Pap stained SurePath slide.  Evaluation episode 1:  Adequate   C.  Thyroid Gland, left lower lobe: Ultrasound guided fine  needle aspiration (FNA) performed by Dr. Terrazas.  Adequacy interpretation at time of procedure is performed by GONZALEZ Conner ([facility]).  In total, 4 air-dried and 1 fixed smears are prepared. They are stained with Diff-Quik and Papanicolaou stains, respectively. Needles are rinsed in 30 mL of CytoRich Red fixative and processed to make 1 additional Pap stained SurePath slide.  Evaluation episode 1:  Adequate   D.  Lymph node, Left Middle Neck : Ultrasound guided fine needle aspiration (FNA) performed by Dr. Terrazas.  Adequacy interpretation at time of procedure is performed by GONZALEZ Conner ([facility]).  In total, 4 air-dried and 1 fixed smears are prepared. They are stained with Diff-Quik and Papanicolaou stains, respectively. Needles are rinsed in 30 mL of CytoRich Red fixative and processed to make 1 additional Pap stained SurePath slide.  Evaluation episode 1:  Adequate    Clinical Information    The patient presents with a right middle thyroid measuring 4.1 x 2.0 x 2.3 cm, right inferior neck lymph node 1.5 x 1.4 x 1.0 cm, left inferior thyroid 1.2 x 0.8 x 0.8 cm and a left middle neck lymph node 1.3 x 1.0 x 0.6 cm.   Microscopic Description    Microscopic examination is performed.   Embedded Images    Resulting Agency [facility]   Specimen Collected: 10/15/19  1:50 PM    Performed by: [facility] Last Resulted: 10/16/19 11:36 AM   Received From: [facility]  Result Received: 02/08/24 11:17 AM     Cytology, non-gynecologic: [id number]  Order: [id number]  Collected 2/26/2024 10:50 AM       Status: Final result       Visible to patient: No (scheduled for 3/6/2024  3:05 PM)       Dx: Lymphadenopathy; Thyroid cancer (H)    0 Result Notes       Component  Ref Range & Units  Resulting Agency   Final Diagnosis   Specimen A                 Interpretation:                   Malignant (Hindman VI), Papillary thyroid carcinoma (pos malig)      ULTRASOUND-GUIDED NEEDLE ASPIRATE OF RIGHT CERVICAL LYMPH NODE:         -  POSITIVE FOR MALIGNANCY        -  TUMOR MORPHOLOGY IS MOST CONSISTENT WITH METASTASIS                FROM PAPILLARY THYROID CARCINOMA              Adequacy:              Satisfactory for evaluation      Electronically signed by Jocelin Gay MD on 2/28/2024 at  3:05 PM   Clinical Information  [facility]   History of papillary thyroid cancer with lymphatic metastasis, enlarged cervical lymph nodes   Rapid Onsite Evaluation  [facility]   FNA Performance:   Fine needle aspiration was not performed by [facility] staff.  Aspirate immediate study/adequacy:  I, JOCELIN GAY MD, attest that I immediately examined smears while the procedure was underway and determined or confirmed the adequacy of the specimens.  It is of note that the final assessment and report may be performed and signed by a different pathologist.  Onsite adequacy/interpretation:  A: Adequate   Gross Description  [facility]   A(A). Lymph Node(s), Cervical, Right, Fine Needle Aspirate:  Received are 5 fixed slides, processed for Pap stain, 5 air dried slides, processed for Diff Quik stain, and material in formalin, processed for one hematoxylin stained cell block.   Microscopic Description  [facility]   Material examined consists of cell block sections stained with H&E, 5 fixed smears stained with Pap stain, and 5 air-dried smears stained with Diff-Quik stain.  Cell block sections demonstrate ribbons or clusters of epithelial cells with variable nuclear size, occasional nuclear grooves, and frequent micro nucleoli.  There is no thickening of nuclear membranes, and no chromatin clearing.  Small tissue fragments are also present, and one such fragment demonstrates a papillary growth pattern with partially hyalinized stroma.  The smear preparations demonstrate numerous large clusters of epithelial cells, often appearing as flat single-layers sheets of cells.  The cells are similar in appearance to the cells noted in the cell block sections.      Abnormal Result?  No Yes Abnormal  [facility]   Performing Labs  [facility]   The technical component of this testing was completed at [facility]              Specimen Collected: 02/26/24 10:50 AM Last Resulted: 02/28/24  3:05 PM           Surgical Path  Specimen: Tissue - Thyroid structure (body structure), Tissue specimen (specimen) - Structure of lymph node...  Component 4 yr ago   Case Report Surgical Pathology                                Case: [id number]                                  Authorizing Provider:  Marie Norwood MD             Collected:           12/03/2019 04:29 PM          Ordering Location:      Operating Room          Received:            12/03/2019 06:36 PM          Pathologist:           Michael Cramer MBBS                                                        Specimens:   A) - Thyroid Gland, Thyroid- stitch marks  right superior pole                                     B) - Lymph nodes, central neck lymph nodes                                             FINAL DIAGNOSIS    A.  Thyroid gand, total thyroidectomy:  Papillary thyroid carcinoma, multifocal involving right lobe, left lobe and isthmus  Largest tumor size:  3.5 cm  Extrathyroidal extension present  Tumor present at disrupted right superior margin, anterior margin and posterior margin  Adenomatoid nodule  See synoptic report and comments below     B.  Lymph nodes, central neck, regional resection:  Six of nine lymph nodes positive for metastatic carcinoma (6/9)  Largest metastatic focus measures approx. 1.2 cm  Focal extranodal tumor extension present  A portion of thymus tissue and parathyroid gland present     Comment:    A.  The papillary thyroid carcinoma is multifocal and is present in all the sections examined.  It involves right lobe, left lobe and isthmus.  Apart from grossly detected nodules, there are numerous microscopic foci of papillary carcinoma  present in multiple sections.  The carcinoma is present at disrupted right superior margin (section A1),  And focally at anterior margin and posterior margin.  Focal extrathyroidal extension is noted into the adjacent fibroadipose tissue and possibly strap muscle.  Focal angiolymphatic space invasion is noted.  Since clinically the tumor is invading the strap muscle, the pathologic stage is upstaged to pT3b.      Electronically signed by Michael Cramer MBBS on 12/5/2019 at  4:18 PM   Synoptic Report THYROID GLAND  (Thyroid - All Specimen  8th Edition - Protocol posted: 2/27/201  SPECIMEN     Procedure:    Total thyroidectom  TUMOR     Tumor Focality:    Multifocal       Tumor Site:    Right lobe       Tumor Site:    Left lobe       Tumor Site:    Isthmus       Histologic Type:    Papillary carcinoma, classic (usual, conventional)       Tumor Size (Centimeters):    Greatest Dimension (Centimeters): 3.5 cm         Additional Dimension (Centimeters):    2.2 cm         Additional Dimension (Centimeters):    2 cm       Extrathyroidal Extension:    Present, clinical / macroscopic AND histologically confirmed         :    Invading only strap muscles (i.e., pT3b)       Angioinvasion (vascular invasion):    Cannot be determined: suspected       Lymphatic Invasion:    Present       Perineural Invasion:    Not identified       Margins:    Involved by carcinoma         Site(s) of Involvement:    Right anterior, posterior and superio  LYMPH NODES   Number of Lymph Nodes Involved:    6   Tila Levels Involved:    Level VI   Size of Largest Metastatic Deposit (Centimeters):    1.2 cm   Extranodal Extension (AFTAB):    Present   Number of Lymph Nodes Examined:    9     Tila Levels Examined:    Level V  PATHOLOGIC STAGE CLASSIFICATION (pTNM, AJCC 8th Edition)   TNM Descriptors:    m (multiple primary tumors)     Primary Tumor (pT):    pT3b   Regional Lymph Nodes (pN):    pN  ADDITIONAL FINDINGS   Additional Pathologic Findings:     "Adenomatoid nodule(s) or nodular follicular disease   Gross Description    A.  Focally disrupted, mostly intact total thyroid with two tumors and multiple small nodules  RECEIVED:  In formalin and labeled with the patient's name  SPECIMEN SOURCE:  \"Thyroid Gland, Thyroid- stitch marks  right superior pole\"  WEIGHT:  17 g  DIMENSIONS:           Right -- 5 x 2.6 x 2.3 cm cm           Left -- 4.2 x 2.6 x 2 cm cm           Isthmus -- 1.5 x 1 x 0.6 cm  EXTERNAL SURFACE:  Focally disrupted along the right superior aspect, dark red, pink-tan with a thin vascular membrane           Adherent soft tissue -- scant amount of possible soft tissue along the right anterior and posterior aspects  ORIENTATION:  Stitch marks right superior pole, anatomical landmarks  INKING:  Anterior -- green, posterior -- black, right superior disruption -- red  TUMOR 1:             Size -- 3.5 x 2.2 x 2 cm           Location -- superior to mid right lobe           Capsule -- portions of thin possible capsule, otherwise not grossly identified           Description -- firm, tan-red, stellate           Extrathyroidal extension -- possible extension to possible anterior and posterior soft tissue  MARGINS:                 Anterior -- abuts               Posterior -- abuts  TUMOR 2:             Size -- 1.2 x 0.9 x 0.8 cm           Location -- left inferior lobe           Capsule -- thin, intact           Description -- well-circumscribed, soft, pink-tan           Extrathyroidal extension -- grossly absent  MARGINS:                 Anterior -- 0.2 cm               Posterior -- 0.1 cm  NODULES(S):  Multiple 0.2 cm tan well-circumscribed nodules throughout the left lobe, mostly abutting the anterior  UNINVOLVED PARENCHYMA:  Grossly normal  PARATHYROIDS:  None identified grossly  TISSUE SUBMISSION:  Representative sections are submitted in 12 cassettes.  1-6.  Representative right lobe, submitted superior to inferior, with representative sections of tumor " "1  7-11.  Representative left lobe, submitted superior to inferior, with tumor 2 in cassettes 9-11, representative small nodules in cassettes 7-8  12. Entire isthmus, sectioned right to left.  DT   B.  The specimen is received in formalin and labeled with the patient's name and \"Lymph nodes, central neck lymph nodes \".  The specimen consists of a 3.8 x 2.8 x 2.1 cm aggregate of lobulated, focally semi firm, red-tan and yellow fibrofatty tissue.  Multiple red-tan, semi firm lymph nodes, some of which appear grossly positive, ranging from 0.2-1.2 cm in greatest dimension, are identified.  All possible lymphoid tissue is submitted in five cassettes.  1. Multiple possible lymph nodes, intact  2. 2 grossly positive lymph nodes, each bisected, one arbitrarily inked black  3. 2 grossly positive lymph nodes, each bisected, one arbitrarily inked black  4. 1 grossly positive lymph node, trisected  5. 1 grossly positive lymph node (largest), serially sectioned.  DT     Clinical Information    Thyroid cancer ([facility])   Microscopic Description    Microscopic examination is performed.   Embedded Images    Resulting Agency [facility]   Specimen Collected: 12/03/19  4:29 PM    Performed by: [facility] Last Resulted: 12/05/19  4:18 PM   Received From: [employer]  Result Received: 02/08/24 11:17 AM       XAM: NM I-131 WHOLE BODY SCAN  LOCATION: [facility]  DATE/TIME: 1/17/2020 11:39 A  INDICATION: Thyroid carcinoma post thyroidectomy and Iodine-131 treatment. Follow up whole-body imaging for staging.  COMPARISON: I-123 scan from 01/10/2020  TECHNIQUE: 148.1 mCi Iodine-131, whole-body images approximately one week post I-131 therapy dosing  FINDINGS: Radionuclide activity in the neck as desired for treatment. Normal physiologic uptake in the salivary glands, stomach, colon, and bladder. Normal uptake in the liver from metabolized radionuclide. No evidence of metastasis  IMPRESSION:  No evidence of iodine-avid " metastasis.  Procedure Note    Siddhartha Esposito MD - 01/17/2020  Formatting of this note might be different from the original.  EXAM: NM I-131 WHOLE BODY SCAN  LOCATION: [facility]  DATE/TIME: 1/17/2020 11:39 A  INDICATION: Thyroid carcinoma post thyroidectomy and Iodine-131 treatment. Follow up whole-body imaging for staging.  COMPARISON: I-123 scan from 01/10/2020  TECHNIQUE: 148.1 mCi Iodine-131, whole-body images approximately one week post I-131 therapy dosing  FINDINGS: Radionuclide activity in the neck as desired for treatment. Normal physiologic uptake in the salivary glands, stomach, colon, and bladder. Normal uptake in the liver from metabolized radionuclide. No evidence of metastasis  IMPRESSION:  No evidence of iodine-avid metastasis.  Exam End: 01/17/20 11:39 AM    Specimen Collected: 01/17/20 11:39 AM Last Resulted: 01/17/20 12:06 PM   Received From: [facility]  Result Received: 02/13/24  8:23 AM       EXAM: US THYROID  LOCATION: [facility]  DATE: 2/14/2024  INDICATION:  Postoperative hypothyroidism  COMPARISON: Ultrasound 09/26/2019.  TECHNIQUE: Thyroid ultrasound.   FINDINGS:  Status post thyroidectomy. Solid nodules in the neck bilaterally without a normal fatty hilum and with internal vascularity on Doppler exam suspicious for lymphadenopathy, on the right measuring 1.4 x 1.3 x 0.8 cm and 1.9 x 1.2 x 1.0 cm, and on the left   measuring 1.0 x 0.7 x 0.8 cm and 1.4 x 0.8 x 0.8 cm.                                                     IMPRESSION:  1.  Status post thyroidectomy with lymphadenopathy in the bilateral neck, the largest lymph node measuring up to 1.9 cm on the right. Correlate with thyroglobulin levels and recommend FNA.      Narrative & Impression   EXAM:  1. FINE-NEEDLE ASPIRATION RIGHT CERVICAL ADENOPATHY  2. ULTRASOUND GUIDANCE  LOCATION: [facility]  DATE: 2/26/2024  INDICATION: Enlarged right cervical lymph nodes.  PROCEDURE:  Informed consent obtained. Time out performed. The site was prepped and draped in sterile fashion. 5 mL of 1% lidocaine was infused into the local soft tissues. Under direct ultrasound guidance, multiple fine-needle aspirates of the nodule   were obtained. The tissue was felt to be adequate by pathology.                                                         IMPRESSION:  1.  Status post ultrasound-guided fine-needle aspiration of right cervical lymph node.  Reference CPT Codes: 64788       Narrative & Impression   EXAM:  1. FINE-NEEDLE ASPIRATION LEFT CERVICAL LYMPH NODE  2. ULTRASOUND GUIDANCE  LOCATION: [facility]  DATE: 3/12/2024  INDICATION: History of papillary thyroid cancer status post treatment 4 years prior. Recent ultrasound with bilateral cervical lymphadenopathy, with the right recently biopsied and pathology suspicious for papillary thyroid carcinoma.   PROCEDURE: Informed consent obtained. Time out performed. The site was prepped and draped in sterile fashion. 10 mL of 1% lidocaine was infused into the local soft tissues.   Under direct ultrasound guidance, multiple fine-needle aspirates of the 1.4 x 0.8 x 0.8 cm left cervical lymph node adjacent to the left internal jugular vein and common carotid artery. The tissue was felt to be adequate by pathology.                                            IMPRESSION:  Status post ultrasound-guided fine-needle aspiration of left cervical lymph node.  Reference CPT Codes: 24619       EXAM: CT CHEST W/O CONTRAST  LOCATION: [facility]  DATE: 3/13/2024  INDICATION:  Lymphadenopathy, Postoperative hypothyroidism, Papillary thyroid carcinoma (H)  COMPARISON: None.  TECHNIQUE: CT chest without IV contrast. Multiplanar reformats were obtained. Dose reduction techniques were used.  CONTRAST: None.  FINDINGS:   LUNGS AND PLEURA: 5 mm benign calcified granuloma seen along right major fissure and 2 mm calcified granuloma  present along inferior left major fissure. Lungs otherwise clear.  MEDIASTINUM/AXILLAE: Numerous small calcified mediastinal and bilateral hilar lymph nodes related to old granulomatous exposure. No significant adenopathy. Heart size normal. Small paratracheal surgical clips at thyroid bed  CORONARY ARTERY CALCIFICATION: None  UPPER ABDOMEN: No significant finding.  MUSCULOSKELETAL: Unremarkable. No suspicious bony lesions.                                                            IMPRESSION:   1.  Evidence for previous granulomatous disease exposure with numerous small mediastinal/hilar calcified lymph nodes and bilateral ulnar granulomata.  2.  No suspicious abnormality evident.    EXAM:  1. FINE-NEEDLE ASPIRATION LEFT CERVICAL LYMPH NODE  2. ULTRASOUND GUIDANCE  LOCATION: [facility]  DATE: 3/12/2024  INDICATION: History of papillary thyroid cancer status post treatment 4 years prior. Recent ultrasound with bilateral cervical lymphadenopathy, with the right recently biopsied and pathology suspicious for papillary thyroid carcinoma.   PROCEDURE: Informed consent obtained. Time out performed. The site was prepped and draped in sterile fashion. 10 mL of 1% lidocaine was infused into the local soft tissues.   Under direct ultrasound guidance, multiple fine-needle aspirates of the 1.4 x 0.8 x 0.8 cm left cervical lymph node adjacent to the left internal jugular vein and common carotid artery. The tissue was felt to be adequate by pathology.                                                        IMPRESSION:Status post ultrasound-guided fine-needle aspiration of left cervical lymph node.  Reference CPT Codes: 44726      Cytology, non-gynecologic: [id number]  Order: [id number]  Collected 3/12/2024  2:31 PM       Status: Final result       Visible to patient: Yes (seen)       Dx: Papillary thyroid carcinoma (H)    0 Result Notes       Component  Ref Range & Units  Resulting Agency   Final  Diagnosis   Specimen A              Interpretation:                Positive for malignancy, Papillary thyroid carcinoma (pos malig)   A. Lymph Node(s), Neck, Left:  - Rare small papillary epithelial groups with nuclear grooving consistent with metastatic papillary carcinoma              Adequacy:              Satisfactory for evaluation     Electronically signed by Tasneem Ward MD on 3/15/2024 at 10:20 AM   Comment   Laboratory   The clinical history been reviewed.  The specimen is relatively scant and consist predominantly of blood with rare lymphocytes and atypical epithelial cells.  BRAF V600E, CK19 and HBME-1 are all negative/noncontributory for evaluation of malignancy on the cellblock.  In this context, I think the features are generally diagnostic of malignancy.  I am happy to review any subsequent data.   Clinical Information  [facility]   Left neck lymphadenopathy   Rapid Onsite Evaluation  [facility]   FNA Performance:   Fine needle aspiration was not performed by [facility] staff.     Aspirate immediate study/adequacy:  I, TASNEEM WARD MD, attest that I immediately examined smears while the procedure was underway and determined or confirmed the adequacy of the specimens.  It is of note that the final assessment and report may be performed and signed by a different pathologist.  Onsite adequacy/interpretation:  A: Adequate   Gross Description  [facility]   A(A). Lymph Node(s), Neck, Left, Fine Needle Aspirate:  Received are 3 fixed slides, processed for Pap stain, 3 air dried slides, processed for Diff Quik stain, and material in formalin, processed for one hematoxylin stained cell block.   Abnormal Result?  No Yes Abnormal   Laboratory   Performing Labs  [facility]   The technical component of this testing was completed at [facility]              Specimen Collected: 03/12/24  2:31 PM Last Resulted: 03/15/24 10:20 AM

## 2024-03-04 NOTE — NURSING NOTE
Is the patient currently in the state of [place]? YES    Visit mode:VIDEO    If the visit is dropped, the patient can be reconnected by: VIDEO VISIT: Send to e-mail at: [email or web address]    Will anyone else be joining the visit? NO  (If patient encounters technical issues they should call [phone])    How would you like to obtain your AVS? MyChart    Are changes needed to the allergy or medication list? No patient reported no changes needed to be made to her e-check in information for visit. VF did not review this information again with patient due to this.    Reason for visit: Consult    Erinn BRAVO

## 2024-03-04 NOTE — LETTER
3/4/2024       RE: Shayy Gaona  [address]  [address]  [place] [address]     Dear Colleague,    Thank you for referring your patient, Shayy Gaona, to the [facility] [place] at [facility]. Please see a copy of my visit note below.    Endocrine Consult Video visit note-    Attending Assessment/Plan :      Cervical adenopathy bilaterally including   A\ Right lateral neck mass with cytology Positive for papillary thyroid carcinoma on recent FNAB.  The FNAB images are not viewable on PACS  B) Abnormal cervical adenopathy left lateral neck     Recommended surgery-- Unclear if right ateral neck dissection  will be sufficient - she has abnormal left sided lateral neck lymph node and history of sarcoidosis  Options discussed   FNAB abnormal left neck mass-- I am placing order for this   She will reach out to Dr Marie Norwood for follow up appt to discuss 2nd operation but it is clear Shayy is very hesitant.      Shayy is also asking about options to delay or avoid surgery.  I have counseled her that I do not believe other treatments will be effective.  We could get more data which might be helpful to convince her to move forward  123I total body scan might be convincing that radioiodine is unlikely to be effective without surgery   Chest CT-- we will order this now - no contrast.   PLEASE AVOID IODINE/CT CONTRAST as IT WILL DELAY OR INTERFERE WITH FUTURE USE OF RADIOACTIVE IODINE FOR DIAGNOSTIC OR THERAPEUTIC PURPOSES.       2.  Papillary thyroid carcinoma, bilateral, multifocal , largest 3.5 cm with ETE, + margin, + 6/9 central neck LNs with AFTAB in 2019 . Treatment was total Tx, CND and 131.  She has had persistent Tg since diagnosis.  The tg has probably risen a bit in the last years.   pT3b, pN1b, cMx  ARPAN recurrent risk high  MACIS 6.33     3.  Post surgical hypothyroidism.  Change LT4 to 150 x 7.5/week (which is what  she is taking now but subdivided differently)  Labs in May, 2024 TSH, free T4, Tg send out to [facility]     4.  Fear of surgical  scar/ insurance issue  -- these appear to be the current barriers to moving forward with surgery for # 1.     5.  History of sarcoidosis per the medical record -- I didn't catch this until after the appt so we didn't discuss it.  This broadens the differential of adenopathy    Due to the COVID 19 pandemic this visit was a video visit in order to help prevent spread of infection in this patient and the general population. The patient gave verbal consent for the visit today. I have independently reviewed and interpreted labs, imaging as indicated.     Distant Location (provider location):  Off-site  Mode of Communication:  Video Conference via [employer]  Chart review/prep time 1  [id number]; [id number]  Visit Start time  1743   Visit Stop time  1814   _96_ minutes spent on the date of the encounter doing chart review, history and exam, documentation and further activities as noted above.    Malika Gottlieb MD    Chief complaint:  Shayy is a 43 year old female seen in consultation at the request of Dr Minnie Martini for Lymphadenopathy positive for PTC   I have reviewed Care Everywhere including  [facility] reports, imaging reports and provider notes as indicated.      HISTORY OF PRESENT ILLNESS  Shayy has been under the care of Dr Greg Mucha at [facility]. She last saw him 4/29/22 . At that time she had no medical insurance.  He recommended US but she didn't want to due to the insurance situation.      Shayy lst presented to Dr Mucha 10/30/19 with report of FNAB of thyroid masses and LNs that were positive for papillary thyroid carcinoma .  Lateral neck US was not performed prior to surgery.   It appears neck CT was recommended but not done then.   Treatment of the thyroid cancer has been as follows:   12/3/19 Total thyroidectomy and  central neck LN dissection (Dr Salazar  Cuco).  Multifocal bilateral PTC, largest 3.5 cm, +ETE macroscopic and histologically confirmed invading strap muscle , positive margin; + 6/9 level 6 LN with AFTAB, largest deposit 1.2   Today she notes she has been on LT4 150 mcg x 8/week.  She had heart palpitations and it was reduced to 150 x 8 every 2 weeks  .     Endocrine relevant labs are as follows:  9/23/19 TSh 1.61  1/10/2020 Tg 64.6, ARPAN < 1, TSH 95.86  5/4/2020 Tg 4.7, ARPAN < 1, TSH 3.27  8/31/2020 Tg 3.1, ARPAN < 1, TSH0.89  11/3/2020 TSH 0.04  5/25/21 Tg 3.5, ARPAN < 1  11/22/2021 Tg 3.2, ARPAN < 1 TSh 0.49  4/2/22 Tg 3.4, ARPAN < 1 TSH 0.04  11/7/2022 TSh 1.03  2/13/24 TSH 0.19, Tg 4.8 in house assay, ARPAN < 20, Ca 9.6    Relevant imaging is as follows: (as read by me as it pertains to endocrine relevant organs)  10/15/19   FNAB right mid mass 2.3 x 2 x 4.1 cm FNAB papillary thyroid carcinoma  Right inf mass 1.4 x 1 x 1.5 cm FNAB papillary thyroid carcinoma  Left inf mass 0.8 x 0.8 x 1.2 FNAB papillary thyroid carcinoma  Left lateral neck mass? 1 x 0.6 x 1.3 - FNAB papillary thyroid carcinoma  1/10/2020 2.2 mCi 123I : 1.7% uptake neck, midline; no abnormal uptake  1/10/2020 148.1 mCi 131I (OPENLANE)   1/17/2020 post therapy TBS negative ; neck uptake only   2/14/24 US thyroid   Abnormal neck mass right lateral neck  1.3 x 0.8 x 1.4  Abnromal neck mass right lateral neck 1.2 x 1 x 1.9   Abnormal neck mass left neck 0.7 x 0.8 x 1   Left neck mass abnormal 0.8 x 0.8 x 1.4 cm      REVIEW OF SYSTEMS  Mixed feelings about it   No symptoms   She recalls she had surgery x 8 hours  VC messed up with the surgery - doesn't have the same singing range since the surgery   Energy OK  Sleep OK   Cardiac: no palpitations  Respiratory: negative  GI: negative   Menses monthly   Insurance will be changing again late March -   Don't want to have surgery again  -- dont want the long scar on both sides of the neck.; dont' want big scars  Want to treat it myself in other ways  -  10 system ROS otherwise as per the HPI or negative    Past Medical History  Past Medical History:   Diagnosis Date    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      levothyroxine (SYNTHROID/LEVOTHROID) 150 MCG tablet Take 1 tablet 6 of 7 days a week, and 2 tablets 1 of 7 days a week.       LT4 dose was 150 x 7 days/week alternating with 150 x 8 every other week    Allergies  Allergies   Allergen Reactions    Blood Transfusion Related (Informational Only)      Jehovah witness--- DECLINED BLOOD TRANSFUSION        Family History  family history includes Arthritis in her father and mother; Colon Cancer in her maternal grandfather; Diabetes in her maternal uncle; Hearing Loss in her father; Hyperlipidemia in her father and mother; Hypertension in her father and mother; Kidney Disease in her mother; Vision Loss in her mother.    Social History  Social History     Tobacco Use    Smoking status: Former     Packs/day: 1.00     Years: 5.00     Additional pack years: 0.00     Total pack years: 5.00     Types: Cigarettes     Start date: 1996     Quit date: 2002     Years since quittin.1    Smokeless tobacco: Former     Quit date: 2001   Substance Use Topics    Alcohol use: Yes     Comment: Occasional    Drug use: Not Currently     Works as RN    Physical Exam  There is no height or weight on file to calculate BMI.  BP Readings from Last 1 Encounters:   22 108/62      Pulse Readings from Last 1 Encounters:   22 89      Resp Readings from Last 1 Encounters:   No data found for Resp      Temp Readings from Last 1 Encounters:   No data found for  "Temp      SpO2 Readings from Last 1 Encounters:   04/20/22 100%      Wt Readings from Last 1 Encounters:   04/20/22 68 kg (150 lb)      Ht Readings from Last 1 Encounters:   04/20/22 1.702 m (5' 7\")     GENERAL: young woman in no distress  SKIN: Visible skin clear. No significant rash, abnormal pigmentation or lesions.  EYES: Eyes grossly normal to inspection.  No discharge or erythema, or obvious scleral/conjunctival abnormalities.  NECK: visible goiter is not present; no visible neck masses  RESP: No audible wheeze, cough, or visible cyanosis.  No visible retractions or increased work of breathing.    NEURO: Awake, alert, responds appropriately to questions.  Mentation and speech fluent.  PSYCH:affect a little flat; appearance well-groomed.      DATA REVIEW    FNA/ Needle Core Biopsy    Component 4 yr ago   Case Report FNA                                               Case: [id number]                                  Authorizing Provider:  Virgil Shi MD     Collected:           10/15/2019 01:50 PM          Ordering Location:     [facility]          Received:            10/15/2019 04:25 PM                                 Ultrasound                                                                  Pathologist:           Joyce Connelly MD                                                      Specimens:   A) - Thyroid Gland, right middle lobe                                                               B) - Lymph node, Right Inferior Neck                                                               C) - Thyroid Gland, left lower lobe                                                                 D) - Lymph node, Left Middle Neck                                                       FINAL DIAGNOSIS    A.  Thyroid Gland, right middle lobe, fine needle aspiration:  Papillary thyroid carcinoma  B.  Lymph node, right inferior neck, fine needle aspiration:  Metastatic papillary thyroid carcinoma  C.  " Thyroid Gland, left lower lobe, fine needle aspiration:  Papillary thyroid carcinoma  D.  Lymph node, left middle neck, fine needle aspiration:  Metastatic papillary thyroid carcinoma   Comment:  The case was seen in consultation with Dr. Saucedo who concurs with the diagnoses.  The diagnosis was given to Daria Victor RN on October 16, 2019.    Electronically signed by Joyce Connelly MD on 10/16/2019 at 11:36 AM   Gross Description    A.  Thyroid Gland, right middle lobe: Ultrasound guided fine needle aspiration (FNA) performed by Dr. Terrazas.  Adequacy interpretation at time of procedure is performed by GONZALEZ Conner ([facility]).  In total, 4 air-dried and 1 fixed smears are prepared. They are stained with Diff-Quik and Papanicolaou stains, respectively. Needles are rinsed in 30 mL of CytoRich Red fixative and processed to make 1 additional Pap stained SurePath slide.  Evaluation episode 1:  Adequat  B.  Lymph node, Right Inferior Neck: Ultrasound guided fine needle aspiration (FNA) performed by Dr. Terrazas.  Adequacy interpretation at time of procedure is performed by GONZALEZ Conner ([facility]).  In total, 4 air-dried and 1 fixed smears are prepared. They are stained with Diff-Quik and Papanicolaou stains, respectively. Needles are rinsed in 30 mL of CytoRich Red fixative and processed to make 1 additional Pap stained SurePath slide.  Evaluation episode 1:  Adequate   C.  Thyroid Gland, left lower lobe: Ultrasound guided fine needle aspiration (FNA) performed by Dr. Terrazas.  Adequacy interpretation at time of procedure is performed by GONZALEZ Conner ([facility]).  In total, 4 air-dried and 1 fixed smears are prepared. They are stained with Diff-Quik and Papanicolaou stains, respectively. Needles are rinsed in 30 mL of CytoRich Red fixative and processed to make 1 additional Pap stained SurePath slide.  Evaluation episode 1:  Adequate   D.  Lymph node, Left Middle Neck : Ultrasound guided fine needle aspiration (FNA)  performed by Dr. Terrazas.  Adequacy interpretation at time of procedure is performed by GONZALEZ Conner ([facility]).  In total, 4 air-dried and 1 fixed smears are prepared. They are stained with Diff-Quik and Papanicolaou stains, respectively. Needles are rinsed in 30 mL of CytoRich Red fixative and processed to make 1 additional Pap stained SurePath slide.  Evaluation episode 1:  Adequate    Clinical Information    The patient presents with a right middle thyroid measuring 4.1 x 2.0 x 2.3 cm, right inferior neck lymph node 1.5 x 1.4 x 1.0 cm, left inferior thyroid 1.2 x 0.8 x 0.8 cm and a left middle neck lymph node 1.3 x 1.0 x 0.6 cm.   Microscopic Description    Microscopic examination is performed.   Embedded Images    Resulting Agency [facility]   Specimen Collected: 10/15/19  1:50 PM    Performed by: [facility] Last Resulted: 10/16/19 11:36 AM   Received From: [facility]  Result Received: 02/08/24 11:17 AM     Cytology, non-gynecologic: [id number]  Order: [id number]  Collected 2/26/2024 10:50 AM       Status: Final result       Visible to patient: No (scheduled for 3/6/2024  3:05 PM)       Dx: Lymphadenopathy; Thyroid cancer (H)    0 Result Notes       Component  Ref Range & Units  Resulting Agency   Final Diagnosis   Specimen A                 Interpretation:                   Malignant ([place] VI), Papillary thyroid carcinoma (pos malig)      ULTRASOUND-GUIDED NEEDLE ASPIRATE OF RIGHT CERVICAL LYMPH NODE:        -  POSITIVE FOR MALIGNANCY        -  TUMOR MORPHOLOGY IS MOST CONSISTENT WITH METASTASIS                FROM PAPILLARY THYROID CARCINOMA              Adequacy:              Satisfactory for evaluation      Electronically signed by Juan Miller MD on 2/28/2024 at  3:05 PM   Clinical Information  [facility]   History of papillary thyroid cancer with lymphatic metastasis, enlarged cervical lymph nodes   Rapid Onsite Evaluation  [facility]   FNA Performance:   Fine needle aspiration was not  performed by [facility] staff.  Aspirate immediate study/adequacy:  I, JOCELIN GAY MD, attest that I immediately examined smears while the procedure was underway and determined or confirmed the adequacy of the specimens.  It is of note that the final assessment and report may be performed and signed by a different pathologist.  Onsite adequacy/interpretation:  A: Adequate   Gross Description  [facility]   A(A). Lymph Node(s), Cervical, Right, Fine Needle Aspirate:  Received are 5 fixed slides, processed for Pap stain, 5 air dried slides, processed for Diff Quik stain, and material in formalin, processed for one hematoxylin stained cell block.   Microscopic Description  [facility]   Material examined consists of cell block sections stained with H&E, 5 fixed smears stained with Pap stain, and 5 air-dried smears stained with Diff-Quik stain.  Cell block sections demonstrate ribbons or clusters of epithelial cells with variable nuclear size, occasional nuclear grooves, and frequent micro nucleoli.  There is no thickening of nuclear membranes, and no chromatin clearing.  Small tissue fragments are also present, and one such fragment demonstrates a papillary growth pattern with partially hyalinized stroma.  The smear preparations demonstrate numerous large clusters of epithelial cells, often appearing as flat single-layers sheets of cells.  The cells are similar in appearance to the cells noted in the cell block sections.     Abnormal Result?  No Yes Abnormal  [facility]   Performing Labs  [facility]   The technical component of this testing was completed at [facility]              Specimen Collected: 02/26/24 10:50 AM Last Resulted: 02/28/24  3:05 PM           Surgical Path  Specimen: Tissue - Thyroid structure (body structure), Tissue specimen (specimen) - Structure of lymph node...  Component 4 yr ago   Case Report Surgical Pathology                                 Case: [id number]                                  Authorizing Provider:  Marie Norwood MD             Collected:           12/03/2019 04:29 PM          Ordering Location:      Operating Room          Received:            12/03/2019 06:36 PM          Pathologist:           Michael Cramer MBBS                                                        Specimens:   A) - Thyroid Gland, Thyroid- stitch marks  right superior pole                                     B) - Lymph nodes, central neck lymph nodes                                             FINAL DIAGNOSIS    A.  Thyroid gand, total thyroidectomy:  Papillary thyroid carcinoma, multifocal involving right lobe, left lobe and isthmus  Largest tumor size:  3.5 cm  Extrathyroidal extension present  Tumor present at disrupted right superior margin, anterior margin and posterior margin  Adenomatoid nodule  See synoptic report and comments below     B.  Lymph nodes, central neck, regional resection:  Six of nine lymph nodes positive for metastatic carcinoma (6/9)  Largest metastatic focus measures approx. 1.2 cm  Focal extranodal tumor extension present  A portion of thymus tissue and parathyroid gland present     Comment:    A.  The papillary thyroid carcinoma is multifocal and is present in all the sections examined.  It involves right lobe, left lobe and isthmus.  Apart from grossly detected nodules, there are numerous microscopic foci of papillary carcinoma present in multiple sections.  The carcinoma is present at disrupted right superior margin (section A1),  And focally at anterior margin and posterior margin.  Focal extrathyroidal extension is noted into the adjacent fibroadipose tissue and possibly strap muscle.  Focal angiolymphatic space invasion is noted.  Since clinically the tumor is invading the strap muscle, the pathologic stage is upstaged to pT3b.      Electronically signed by Michael Cramer MBBS on 12/5/2019 at  4:18 PM   Synoptic  "Report THYROID GLAND  (Thyroid - All Specimen  8th Edition - Protocol posted: 2/27/201  SPECIMEN     Procedure:    Total thyroidectom  TUMOR     Tumor Focality:    Multifocal       Tumor Site:    Right lobe       Tumor Site:    Left lobe       Tumor Site:    Isthmus       Histologic Type:    Papillary carcinoma, classic (usual, conventional)       Tumor Size (Centimeters):    Greatest Dimension (Centimeters): 3.5 cm         Additional Dimension (Centimeters):    2.2 cm         Additional Dimension (Centimeters):    2 cm       Extrathyroidal Extension:    Present, clinical / macroscopic AND histologically confirmed         :    Invading only strap muscles (i.e., pT3b)       Angioinvasion (vascular invasion):    Cannot be determined: suspected       Lymphatic Invasion:    Present       Perineural Invasion:    Not identified       Margins:    Involved by carcinoma         Site(s) of Involvement:    Right anterior, posterior and superio  LYMPH NODES   Number of Lymph Nodes Involved:    6   Tila Levels Involved:    Level VI   Size of Largest Metastatic Deposit (Centimeters):    1.2 cm   Extranodal Extension (AFTAB):    Present   Number of Lymph Nodes Examined:    9     Tila Levels Examined:    Level V  PATHOLOGIC STAGE CLASSIFICATION (pTNM, AJCC 8th Edition)   TNM Descriptors:    m (multiple primary tumors)     Primary Tumor (pT):    pT3b   Regional Lymph Nodes (pN):    pN  ADDITIONAL FINDINGS   Additional Pathologic Findings:    Adenomatoid nodule(s) or nodular follicular disease   Gross Description    A.  Focally disrupted, mostly intact total thyroid with two tumors and multiple small nodules  RECEIVED:  In formalin and labeled with the patient's name  SPECIMEN SOURCE:  \"Thyroid Gland, Thyroid- stitch marks  right superior pole\"  WEIGHT:  17 g  DIMENSIONS:           Right -- 5 x 2.6 x 2.3 cm cm           Left -- 4.2 x 2.6 x 2 cm cm           Isthmus -- 1.5 x 1 x 0.6 cm  EXTERNAL SURFACE:  Focally disrupted along the " "right superior aspect, dark red, pink-tan with a thin vascular membrane           Adherent soft tissue -- scant amount of possible soft tissue along the right anterior and posterior aspects  ORIENTATION:  Stitch marks right superior pole, anatomical landmarks  INKING:  Anterior -- green, posterior -- black, right superior disruption -- red  TUMOR 1:             Size -- 3.5 x 2.2 x 2 cm           Location -- superior to mid right lobe           Capsule -- portions of thin possible capsule, otherwise not grossly identified           Description -- firm, tan-red, stellate           Extrathyroidal extension -- possible extension to possible anterior and posterior soft tissue  MARGINS:                 Anterior -- abuts               Posterior -- abuts  TUMOR 2:             Size -- 1.2 x 0.9 x 0.8 cm           Location -- left inferior lobe           Capsule -- thin, intact           Description -- well-circumscribed, soft, pink-tan           Extrathyroidal extension -- grossly absent  MARGINS:                 Anterior -- 0.2 cm               Posterior -- 0.1 cm  NODULES(S):  Multiple 0.2 cm tan well-circumscribed nodules throughout the left lobe, mostly abutting the anterior  UNINVOLVED PARENCHYMA:  Grossly normal  PARATHYROIDS:  None identified grossly  TISSUE SUBMISSION:  Representative sections are submitted in 12 cassettes.  1-6.  Representative right lobe, submitted superior to inferior, with representative sections of tumor 1  7-11.  Representative left lobe, submitted superior to inferior, with tumor 2 in cassettes 9-11, representative small nodules in cassettes 7-8  12. Entire isthmus, sectioned right to left.  DT   B.  The specimen is received in formalin and labeled with the patient's name and \"Lymph nodes, central neck lymph nodes \".  The specimen consists of a 3.8 x 2.8 x 2.1 cm aggregate of lobulated, focally semi firm, red-tan and yellow fibrofatty tissue.  Multiple red-tan, semi firm lymph nodes, some of " which appear grossly positive, ranging from 0.2-1.2 cm in greatest dimension, are identified.  All possible lymphoid tissue is submitted in five cassettes.  1. Multiple possible lymph nodes, intact  2. 2 grossly positive lymph nodes, each bisected, one arbitrarily inked black  3. 2 grossly positive lymph nodes, each bisected, one arbitrarily inked black  4. 1 grossly positive lymph node, trisected  5. 1 grossly positive lymph node (largest), serially sectioned.  DT     Clinical Information    Thyroid cancer ([facility])   Microscopic Description    Microscopic examination is performed.   Embedded Images    Resulting Agency [facility]   Specimen Collected: 12/03/19  4:29 PM    Performed by: [facility] Last Resulted: 12/05/19  4:18 PM   Received From: [facility]  Result Received: 02/08/24 11:17 AM       XAM: NM I-131 WHOLE BODY SCAN  LOCATION: [facility]  DATE/TIME: 1/17/2020 11:39 A  INDICATION: Thyroid carcinoma post thyroidectomy and Iodine-131 treatment. Follow up whole-body imaging for staging.  COMPARISON: I-123 scan from 01/10/2020  TECHNIQUE: 148.1 mCi Iodine-131, whole-body images approximately one week post I-131 therapy dosing  FINDINGS: Radionuclide activity in the neck as desired for treatment. Normal physiologic uptake in the salivary glands, stomach, colon, and bladder. Normal uptake in the liver from metabolized radionuclide. No evidence of metastasis  IMPRESSION:  No evidence of iodine-avid metastasis.  Procedure Note    Siddhartha Esposito MD - 01/17/2020  Formatting of this note might be different from the original.  EXAM: NM I-131 WHOLE BODY SCAN  LOCATION: [facility]  DATE/TIME: 1/17/2020 11:39 A  INDICATION: Thyroid carcinoma post thyroidectomy and Iodine-131 treatment. Follow up whole-body imaging for staging.  COMPARISON: I-123 scan from 01/10/2020  TECHNIQUE: 148.1 mCi Iodine-131, whole-body images approximately one week post I-131 therapy dosing  FINDINGS: Radionuclide  activity in the neck as desired for treatment. Normal physiologic uptake in the salivary glands, stomach, colon, and bladder. Normal uptake in the liver from metabolized radionuclide. No evidence of metastasis  IMPRESSION:  No evidence of iodine-avid metastasis.  Exam End: 01/17/20 11:39 AM    Specimen Collected: 01/17/20 11:39 AM Last Resulted: 01/17/20 12:06 PM   Received From: [facility]  Result Received: 02/13/24  8:23 AM       EXAM: US THYROID  LOCATION: [facility]  DATE: 2/14/2024  INDICATION:  Postoperative hypothyroidism  COMPARISON: Ultrasound 09/26/2019.  TECHNIQUE: Thyroid ultrasound.   FINDINGS:  Status post thyroidectomy. Solid nodules in the neck bilaterally without a normal fatty hilum and with internal vascularity on Doppler exam suspicious for lymphadenopathy, on the right measuring 1.4 x 1.3 x 0.8 cm and 1.9 x 1.2 x 1.0 cm, and on the left   measuring 1.0 x 0.7 x 0.8 cm and 1.4 x 0.8 x 0.8 cm.                                                     IMPRESSION:  1.  Status post thyroidectomy with lymphadenopathy in the bilateral neck, the largest lymph node measuring up to 1.9 cm on the right. Correlate with thyroglobulin levels and recommend FNA.      Narrative & Impression   EXAM:  1. FINE-NEEDLE ASPIRATION RIGHT CERVICAL ADENOPATHY  2. ULTRASOUND GUIDANCE  LOCATION: [facility]  DATE: 2/26/2024  INDICATION: Enlarged right cervical lymph nodes.  PROCEDURE: Informed consent obtained. Time out performed. The site was prepped and draped in sterile fashion. 5 mL of 1% lidocaine was infused into the local soft tissues. Under direct ultrasound guidance, multiple fine-needle aspirates of the nodule   were obtained. The tissue was felt to be adequate by pathology.                                                         IMPRESSION:  1.  Status post ultrasound-guided fine-needle aspiration of right cervical lymph node.  Reference CPT Codes: 69719       Malika  JAME Gottlieb MD

## 2024-03-05 NOTE — PATIENT INSTRUCTIONS
Chest CT now  - no contrast ([id number] to schedule)    Get back to see Dr Norwood-- referral back to Dr Norwood  ([phone] to schedule; referral is on the chart).  Let me know what she says.     US guided needle biopsy of left lateral neck  mass  -- my team should reach out to help you schedule this.     Labs and repeat US mid May 2024 if no surgery before then -- and see me a few weeks later

## 2024-03-06 ENCOUNTER — TELEPHONE (OUTPATIENT)
Dept: ENDOCRINOLOGY | Facility: CLINIC | Age: 44
End: 2024-03-06
Payer: COMMERCIAL

## 2024-03-06 NOTE — TELEPHONE ENCOUNTER
Patient call:     Appointment type: CT scan   Provider: Bull   Return date: 3/13   Speciality phone number:   Additional appointment(s) needed:   Additional notes: Spoke to pt and scheduled at preferred location     Joyce Horowitz on 3/6/2024 at 10:11 AM

## 2024-03-08 ENCOUNTER — TELEPHONE (OUTPATIENT)
Dept: ENDOCRINOLOGY | Facility: CLINIC | Age: 44
End: 2024-03-08
Payer: COMMERCIAL

## 2024-03-08 NOTE — TELEPHONE ENCOUNTER
Spoke with patient to schedule checkout orders per Dr. Gottlieb- patient declined scheduling appts at this time. Prefers to wait and will call back to schedule later. CC number provided    Appointment type: Return Thyroid Cancer  Provider: Dr. Gottlieb  Return date: June 2024  Specialty phone number: [phone]  Additional appointment(s) needed: labs/US- May 2024  Checkout comments: CT chest now Refer back to Dr Marie Norwood at [facility] - referral on the system; Labs and neck US in May 2024 RTC June 2024 - OK to use return ABHIJEET

## 2024-03-12 ENCOUNTER — HOSPITAL ENCOUNTER (OUTPATIENT)
Dept: ULTRASOUND IMAGING | Facility: CLINIC | Age: 44
Discharge: HOME | End: 2024-03-12
Admitting: STUDENT IN AN ORGANIZED HEALTH CARE EDUCATION/TRAINING PROGRAM
Payer: COMMERCIAL

## 2024-03-12 DIAGNOSIS — R59.1 LYMPHADENOPATHY: ICD-10-CM

## 2024-03-12 DIAGNOSIS — E89.0 POSTOPERATIVE HYPOTHYROIDISM: ICD-10-CM

## 2024-03-12 DIAGNOSIS — C73 PAPILLARY THYROID CARCINOMA (H): ICD-10-CM

## 2024-03-12 PROCEDURE — 88172 CYTP DX EVAL FNA 1ST EA SITE: CPT | Mod: 26 | Performed by: PATHOLOGY

## 2024-03-12 PROCEDURE — 88341 IMHCHEM/IMCYTCHM EA ADD ANTB: CPT | Mod: 26 | Performed by: PATHOLOGY

## 2024-03-12 PROCEDURE — 88342 IMHCHEM/IMCYTCHM 1ST ANTB: CPT | Mod: 26 | Performed by: PATHOLOGY

## 2024-03-12 PROCEDURE — 88305 TISSUE EXAM BY PATHOLOGIST: CPT | Mod: 26 | Performed by: PATHOLOGY

## 2024-03-12 PROCEDURE — 88305 TISSUE EXAM BY PATHOLOGIST: CPT | Mod: TC

## 2024-03-12 PROCEDURE — 272N000710 US BIOPSY FINE NEEDLE ASPIRATION LYMPH NODE

## 2024-03-12 PROCEDURE — 88173 CYTOPATH EVAL FNA REPORT: CPT | Mod: TC

## 2024-03-12 PROCEDURE — 88173 CYTOPATH EVAL FNA REPORT: CPT | Mod: 26 | Performed by: PATHOLOGY

## 2024-03-13 ENCOUNTER — HOSPITAL ENCOUNTER (OUTPATIENT)
Dept: CT IMAGING | Facility: HOSPITAL | Age: 44
Discharge: HOME | End: 2024-03-13
Payer: COMMERCIAL

## 2024-03-13 DIAGNOSIS — E89.0 POSTOPERATIVE HYPOTHYROIDISM: ICD-10-CM

## 2024-03-13 DIAGNOSIS — C73 PAPILLARY THYROID CARCINOMA (H): ICD-10-CM

## 2024-03-13 DIAGNOSIS — R59.1 LYMPHADENOPATHY: ICD-10-CM

## 2024-03-13 PROCEDURE — 71250 CT THORAX DX C-: CPT

## 2024-03-15 LAB
PATH REPORT.COMMENTS IMP SPEC: ABNORMAL
PATH REPORT.COMMENTS IMP SPEC: YES
PATH REPORT.FINAL DX SPEC: ABNORMAL
PATH REPORT.GROSS SPEC: ABNORMAL
PATH REPORT.RELEVANT HX SPEC: ABNORMAL

## 2024-05-13 ENCOUNTER — LAB (OUTPATIENT)
Dept: LAB | Facility: CLINIC | Age: 44
End: 2024-05-13
Payer: COMMERCIAL

## 2024-05-13 DIAGNOSIS — R59.1 LYMPHADENOPATHY: ICD-10-CM

## 2024-05-13 DIAGNOSIS — C73 PAPILLARY THYROID CARCINOMA (H): ICD-10-CM

## 2024-05-13 DIAGNOSIS — E89.0 POSTOPERATIVE HYPOTHYROIDISM: ICD-10-CM

## 2024-05-13 PROCEDURE — 36415 COLL VENOUS BLD VENIPUNCTURE: CPT

## 2024-05-13 PROCEDURE — 99000 SPECIMEN HANDLING OFFICE-LAB: CPT

## 2024-05-13 PROCEDURE — 84432 ASSAY OF THYROGLOBULIN: CPT | Mod: 90

## 2024-05-13 PROCEDURE — 86800 THYROGLOBULIN ANTIBODY: CPT | Mod: 90

## 2024-05-17 LAB — SCANNED LAB RESULT: NORMAL

## 2024-05-20 ENCOUNTER — LAB (OUTPATIENT)
Dept: LAB | Facility: CLINIC | Age: 44
End: 2024-05-20
Payer: COMMERCIAL

## 2024-05-20 DIAGNOSIS — C73 PAPILLARY THYROID CARCINOMA (H): ICD-10-CM

## 2024-05-20 DIAGNOSIS — E89.0 POSTOPERATIVE HYPOTHYROIDISM: ICD-10-CM

## 2024-05-20 DIAGNOSIS — R59.1 LYMPHADENOPATHY: ICD-10-CM

## 2024-05-20 LAB
T4 FREE SERPL-MCNC: 1.67 NG/DL (ref 0.9–1.7)
TSH SERPL DL<=0.005 MIU/L-ACNC: 2.33 UIU/ML (ref 0.3–4.2)

## 2024-05-20 PROCEDURE — 84439 ASSAY OF FREE THYROXINE: CPT

## 2024-05-20 PROCEDURE — 36415 COLL VENOUS BLD VENIPUNCTURE: CPT

## 2024-05-20 PROCEDURE — 84443 ASSAY THYROID STIM HORMONE: CPT

## 2024-05-24 DIAGNOSIS — E89.0 POSTOPERATIVE HYPOTHYROIDISM: ICD-10-CM

## 2024-05-24 DIAGNOSIS — R59.1 LYMPHADENOPATHY: ICD-10-CM

## 2024-05-24 DIAGNOSIS — C73 PAPILLARY THYROID CARCINOMA (H): Primary | ICD-10-CM

## 2024-07-15 ENCOUNTER — LAB (OUTPATIENT)
Dept: LAB | Facility: CLINIC | Age: 44
End: 2024-07-15
Payer: COMMERCIAL

## 2024-07-15 DIAGNOSIS — E89.0 POSTOPERATIVE HYPOTHYROIDISM: ICD-10-CM

## 2024-07-15 DIAGNOSIS — C73 PAPILLARY THYROID CARCINOMA (H): ICD-10-CM

## 2024-07-15 DIAGNOSIS — R59.1 LYMPHADENOPATHY: ICD-10-CM

## 2024-07-15 LAB
T4 FREE SERPL-MCNC: 1.9 NG/DL (ref 0.9–1.7)
TSH SERPL DL<=0.005 MIU/L-ACNC: 0.25 UIU/ML (ref 0.3–4.2)

## 2024-07-15 PROCEDURE — 84443 ASSAY THYROID STIM HORMONE: CPT

## 2024-07-15 PROCEDURE — 36415 COLL VENOUS BLD VENIPUNCTURE: CPT

## 2024-07-15 PROCEDURE — 84439 ASSAY OF FREE THYROXINE: CPT

## 2024-07-15 PROCEDURE — 99000 SPECIMEN HANDLING OFFICE-LAB: CPT

## 2024-07-15 PROCEDURE — 84432 ASSAY OF THYROGLOBULIN: CPT | Mod: 90

## 2024-07-15 PROCEDURE — 86800 THYROGLOBULIN ANTIBODY: CPT | Mod: 90

## 2024-07-19 LAB — SCANNED LAB RESULT: NORMAL

## 2024-09-16 ENCOUNTER — LAB (OUTPATIENT)
Dept: LAB | Facility: CLINIC | Age: 44
End: 2024-09-16
Payer: COMMERCIAL

## 2024-09-16 DIAGNOSIS — C73 PAPILLARY THYROID CARCINOMA (H): ICD-10-CM

## 2024-09-16 DIAGNOSIS — R59.1 LYMPHADENOPATHY: ICD-10-CM

## 2024-09-16 DIAGNOSIS — E89.0 POSTOPERATIVE HYPOTHYROIDISM: ICD-10-CM

## 2024-09-16 LAB
T4 FREE SERPL-MCNC: 1.79 NG/DL (ref 0.9–1.7)
TSH SERPL DL<=0.005 MIU/L-ACNC: 2.37 UIU/ML (ref 0.3–4.2)

## 2024-09-16 PROCEDURE — 36415 COLL VENOUS BLD VENIPUNCTURE: CPT

## 2024-09-16 PROCEDURE — 99000 SPECIMEN HANDLING OFFICE-LAB: CPT

## 2024-09-16 PROCEDURE — 84439 ASSAY OF FREE THYROXINE: CPT

## 2024-09-16 PROCEDURE — 84443 ASSAY THYROID STIM HORMONE: CPT

## 2024-09-16 PROCEDURE — 84432 ASSAY OF THYROGLOBULIN: CPT | Mod: 90

## 2024-09-16 PROCEDURE — 86800 THYROGLOBULIN ANTIBODY: CPT | Mod: 90

## 2024-09-20 LAB — SCANNED LAB RESULT: NORMAL

## 2024-09-26 ENCOUNTER — LAB (OUTPATIENT)
Dept: LAB | Facility: CLINIC | Age: 44
End: 2024-09-26
Payer: COMMERCIAL

## 2024-09-26 DIAGNOSIS — C73 PAPILLARY THYROID CARCINOMA (H): ICD-10-CM

## 2024-09-26 DIAGNOSIS — R59.1 LYMPHADENOPATHY: ICD-10-CM

## 2024-09-26 DIAGNOSIS — E89.0 POSTOPERATIVE HYPOTHYROIDISM: ICD-10-CM

## 2024-09-26 PROCEDURE — 99000 SPECIMEN HANDLING OFFICE-LAB: CPT

## 2024-09-26 PROCEDURE — 36415 COLL VENOUS BLD VENIPUNCTURE: CPT

## 2024-09-26 PROCEDURE — 84432 ASSAY OF THYROGLOBULIN: CPT | Mod: 90

## 2024-09-26 PROCEDURE — 86800 THYROGLOBULIN ANTIBODY: CPT | Mod: 90

## 2024-10-02 LAB — SCANNED LAB RESULT: NORMAL

## 2024-10-31 DIAGNOSIS — E89.0 POSTOPERATIVE HYPOTHYROIDISM: ICD-10-CM

## 2024-10-31 DIAGNOSIS — R59.1 LYMPHADENOPATHY: ICD-10-CM

## 2024-10-31 DIAGNOSIS — C73 PAPILLARY THYROID CARCINOMA (H): ICD-10-CM

## 2024-10-31 RX ORDER — LEVOTHYROXINE SODIUM 150 UG/1
TABLET ORAL
Qty: 97 TABLET | Refills: 4 | OUTPATIENT
Start: 2024-10-31

## 2024-11-18 ENCOUNTER — LAB (OUTPATIENT)
Dept: LAB | Facility: CLINIC | Age: 44
End: 2024-11-18
Payer: COMMERCIAL

## 2024-11-18 DIAGNOSIS — C73 PAPILLARY THYROID CARCINOMA (H): ICD-10-CM

## 2024-11-18 DIAGNOSIS — E89.0 POSTOPERATIVE HYPOTHYROIDISM: ICD-10-CM

## 2024-11-18 DIAGNOSIS — R59.1 LYMPHADENOPATHY: ICD-10-CM

## 2024-11-18 LAB
T4 FREE SERPL-MCNC: 1.83 NG/DL (ref 0.9–1.7)
TSH SERPL DL<=0.005 MIU/L-ACNC: 0.53 UIU/ML (ref 0.3–4.2)

## 2024-12-30 ENCOUNTER — LAB (OUTPATIENT)
Dept: LAB | Facility: CLINIC | Age: 44
End: 2024-12-30
Payer: COMMERCIAL

## 2024-12-30 DIAGNOSIS — R59.1 LYMPHADENOPATHY: ICD-10-CM

## 2024-12-30 DIAGNOSIS — C73 PAPILLARY THYROID CARCINOMA (H): ICD-10-CM

## 2024-12-30 DIAGNOSIS — E89.0 POSTOPERATIVE HYPOTHYROIDISM: ICD-10-CM

## 2024-12-30 LAB
T4 FREE SERPL-MCNC: 1.89 NG/DL (ref 0.9–1.7)
TSH SERPL DL<=0.005 MIU/L-ACNC: 0.26 UIU/ML (ref 0.3–4.2)

## 2024-12-30 PROCEDURE — 84432 ASSAY OF THYROGLOBULIN: CPT | Mod: 90

## 2024-12-30 PROCEDURE — 86800 THYROGLOBULIN ANTIBODY: CPT | Mod: 90

## 2024-12-30 PROCEDURE — 99000 SPECIMEN HANDLING OFFICE-LAB: CPT

## 2024-12-30 PROCEDURE — 84439 ASSAY OF FREE THYROXINE: CPT

## 2024-12-30 PROCEDURE — 36415 COLL VENOUS BLD VENIPUNCTURE: CPT

## 2024-12-30 PROCEDURE — 84443 ASSAY THYROID STIM HORMONE: CPT

## 2025-01-07 LAB — SCANNED LAB RESULT: NORMAL

## 2025-03-16 ENCOUNTER — HEALTH MAINTENANCE LETTER (OUTPATIENT)
Age: 45
End: 2025-03-16

## 2025-04-03 DIAGNOSIS — C73 PAPILLARY THYROID CARCINOMA (H): ICD-10-CM

## 2025-04-03 DIAGNOSIS — R59.1 LYMPHADENOPATHY: ICD-10-CM

## 2025-04-03 DIAGNOSIS — E89.0 POSTOPERATIVE HYPOTHYROIDISM: ICD-10-CM

## 2025-04-07 RX ORDER — LEVOTHYROXINE SODIUM 150 UG/1
TABLET ORAL
Qty: 96 TABLET | Refills: 4 | Status: SHIPPED | OUTPATIENT
Start: 2025-04-07